# Patient Record
Sex: FEMALE | Race: WHITE | NOT HISPANIC OR LATINO | Employment: FULL TIME | URBAN - METROPOLITAN AREA
[De-identification: names, ages, dates, MRNs, and addresses within clinical notes are randomized per-mention and may not be internally consistent; named-entity substitution may affect disease eponyms.]

---

## 2017-06-02 LAB
EXTERNAL HIV SCREEN: NORMAL
HCV AB SER-ACNC: NEGATIVE

## 2021-05-06 PROBLEM — IMO0001 SMOKING: Status: ACTIVE | Noted: 2021-05-06

## 2023-01-09 PROBLEM — M25.649 JOINT STIFFNESS OF HAND, UNSPECIFIED LATERALITY: Status: ACTIVE | Noted: 2021-09-13

## 2023-01-09 PROBLEM — M79.642 CHRONIC HAND PAIN, LEFT: Status: ACTIVE | Noted: 2021-11-15

## 2023-01-09 PROBLEM — G89.29 CHRONIC HAND PAIN, LEFT: Status: ACTIVE | Noted: 2021-11-15

## 2023-05-11 ENCOUNTER — TELEPHONE (OUTPATIENT)
Dept: GENETICS | Facility: CLINIC | Age: 38
End: 2023-05-11

## 2023-05-11 NOTE — TELEPHONE ENCOUNTER
I called and left a message for Johanne Barnes to call our office to confirm her upcoming appointment on May 16 at 9:00 am, I provided our address in the message

## 2023-05-16 ENCOUNTER — DOCUMENTATION (OUTPATIENT)
Dept: GENETICS | Facility: CLINIC | Age: 38
End: 2023-05-16

## 2023-05-16 ENCOUNTER — TELEPHONE (OUTPATIENT)
Dept: GENETICS | Facility: CLINIC | Age: 38
End: 2023-05-16

## 2023-05-16 ENCOUNTER — CLINICAL SUPPORT (OUTPATIENT)
Dept: GENETICS | Facility: CLINIC | Age: 38
End: 2023-05-16

## 2023-05-16 DIAGNOSIS — Z80.41 FAMILY HISTORY OF OVARIAN CANCER: Primary | ICD-10-CM

## 2023-05-16 DIAGNOSIS — Z80.3 FAMILY HISTORY OF BREAST CANCER: ICD-10-CM

## 2023-05-16 NOTE — PROGRESS NOTES
"Pre-Test Genetic Counseling Consult Note    Patient Name: Razia Crawley   /Age: [age] y o  Referring Provider: NAGI Barillas    Date of Service: 2023  Genetic Counselor: Laurie Vasquez MS, [address]  Interpretation Services: None  Location: Telephone consult   Length of Visit: 61 minutes      Everett Grover was referred to the [address] [place] [address] and Genetic Assessment Program due to her family history of breast and ovarian cancer  she presents today to discuss the possibility of a hereditary cancer syndrome, options for genetic testing, and implications for her and her family          Cancer History and Treatment:   Personal History: no personal history of cancer    Screening Hx:   Breast:  Clinical exams annually    Colon:  Colonoscopy: none    Gynecologic:  Ovaries and Uterus intact ; bilateral salpingectomy at 32    Skin:  No current screening    Other screening: none    Reproductive History  Age at menarche: 17  Age at first live birth: 27  Menopause: Premenopausal  Hormone replacement: none    Medical and Surgical History  Pertinent surgical history:   Past Surgical History:   Procedure Laterality Date   •  SECTION      x1   • NE BREAST AUGMENTATION WITH IMPLANT Bilateral 2021    Procedure: AUGMENTATION BREAST;  Surgeon: Lois Felipe MD;  Location: [address] [place] [address];  Service: Plastics   • TUBAL LIGATION        Pertinent medical history:  Past Medical History:   Diagnosis Date   • Abnormal Pap smear of cervix    • History of urinary tract infection     Past hx of UTI - no current issues   • Kidney stone     x1 episode with kidney stone- no surgery needed -passed on her own    • PTSD (post-traumatic stress disorder)     Pt states with death of a daughter - is stable at this time   • Seasonal allergies          Other History:  Height:   Ht Readings from Last 1 Encounters:   23 5' 9\" (1 753 m)     Weight:   Wt Readings from Last 1 Encounters:   23 76 2 kg (168 lb)       Relevant Family " History   Patient reports no Ashkenazi Jainism ancestry  Maternal Family History:  No reported history of cancer    Paternal Family History:   Grandmother (d 70) history of ovarian cancer (dx 48) and breast cancer (dx 76)   No other reported history of cancer    Please refer to the scanned pedigree in the Media Tab for a complete family history     *All history is reported as provided by the patient; records are not available for review, except where indicated  Assessment:  We discussed sporadic, familial and hereditary cancer  We also discussed the many factors that influence our risk for cancer such as age, environmental exposures, lifestyle choices and family history  We reviewed the indications suggestive of a hereditary predisposition to cancer  Genetic testing is indicated for Lila Shultz based on the following criteria: Meets NCCN V2 2023 Testing Criteria for High-Penetrance Breast Cancer Susceptibility Genes: family history of a second-degree relative with a history of breast and ovarian cancer  Meets NCCN V2 2023 Testing Criteria for Ovarian Cancer Susceptibility Genes: family history of a second-degree relative diagnosed with ovarian cancer    The risks, benefits, and limitations of genetic testing were reviewed with the patient, as well as genetic discrimination laws, and possible test results (positive, negative, variants of uncertain significance) and their clinical implications  If positive for a mutation, options for managing cancer risk including increased surveillance, chemoprevention, and in some cases prophylactic surgery were discussed  Lila Shultz was informed that if a hereditary cancer syndrome was identified in her, first degree relatives (parents, siblings, and children) have a chance of also inheriting the condition  Genetic testing would allow for predictive genetic testing in other relatives, who may also be at risk depending on their degree of relation       Billing:  Most individuals pay <$100 for hereditary cancer genetic testing  If insurance covers the cost of the testing, individuals may still pay out of pocket secondary to co-pays, co-insurance, or deductibles  If the cost of the testing exceeds $100, the lab will reach out to the patient via phone or e-mail  The patient will then have the option to proceed with the testing, cancel the testing, or elect the self-pay option of $250  Heather Crane verbalized understanding  Plan: Patient decided to proceed with testing and provided consent  Summary:     Sample Collection:  A blood kit was mailed home to the patient    Genetic Testing Preformed: CustomNext: Cancer® +RNAinsight® (61 genes): APC, GABRIELLA, AXIN2, BAP1, BARD1, BMPR1A, BRCA1, BRCA2, BRIP1, CDH1, CDK4, CDKN1B, CDKN2A, CHEK2, CTNNA1, DICER1, EGLN1, EPCAM, FH, FLCN, GREM1, HOXB13, KIF1B, KIT, MAX, MEN1, MET, MITF, MLH1, MSH2, MSH3, MSH6, MUTYH, NBN, NF1, NTHL1, PALB2, PMS2, POLD1, POLE, POT1, PTEN, RAD50, RAD51C, RAD51D, RB1, RECQL, RET, SDHA, SDHAF2, SDHB, SDHC, SDHD, SMAD4, SMARCA4, STK11, OMLH058, TP53, TSC1, TSC2, VHL     Result Call Information:  I confirmed the patient's mobile number on file as the best number to call with results  I confirmed with the patient that we can leave a voicemail on the provided numbers    Results take approximately 2-3 weeks to complete once test is started  We will contact Heather Crane once results are available  Additional recommendations for surveillance/medical management will be made pending genetic test results

## 2023-05-16 NOTE — TELEPHONE ENCOUNTER
Patient called into our office regarding her 9:00 am appointment she will not be able to come in person as her daughter woke up sick, I offered converting her appointment to a televisit she was agreeable

## 2023-05-16 NOTE — LETTER
May 16, 2023     Francoise Rubio, [address]  [address] [place] [address]    Patient: Ana Kumar  YOB: 1985  Date of Visit: 2023      Dear Dr Cristopher Ramirez: Thank you for referring Ana Kumar to me for evaluation  Below are my notes for this consultation  If you have questions, please do not hesitate to call me  I look forward to following your patient along with you  Sincerely,        Rica Hinton        CC: Katerina Rodriguez DO        Pre-Test Genetic Counseling Consult Note    Patient Name: Ana Kumar   /Age: /30 y o  Referring Provider: NAGI Gonzalez    Date of Service: 2023  Genetic Counselor: Rica Hinton MS, [address] [facility]  Interpretation Services: None  Location: Telephone consult   Length of Visit: 61 minutes      Yaakov Moss was referred to the [address] [place] [address] and Genetic Assessment Program due to her family history of breast and ovarian cancer  she presents today to discuss the possibility of a hereditary cancer syndrome, options for genetic testing, and implications for her and her family          Cancer History and Treatment:   Personal History: no personal history of cancer    Screening Hx:   Breast:  Clinical exams annually    Colon:  Colonoscopy: none    Gynecologic:  Ovaries and Uterus intact ; bilateral salpingectomy at 32    Skin:  No current screening    Other screening: none    Reproductive History  Age at menarche: 17  Age at first live birth: 27  Menopause: Premenopausal  Hormone replacement: none    Medical and Surgical History  Pertinent surgical history:   Past Surgical History:   Procedure Laterality Date   •  SECTION      x1   • AR BREAST AUGMENTATION WITH IMPLANT Bilateral 2021    Procedure: AUGMENTATION BREAST;  Surgeon: Sotero Walls MD;  Location: [address] [place] [address];  Service: Plastics   • TUBAL LIGATION        Pertinent medical history:  Past Medical History:   Diagnosis Date   • Abnormal Pap smear of cervix    • "History of urinary tract infection     Past hx of UTI - no current issues   • Kidney stone     x1 episode with kidney stone- no surgery needed -passed on her own    • PTSD (post-traumatic stress disorder)     Pt states with death of a daughter - is stable at this time   • Seasonal allergies          Other History:  Height:   Ht Readings from Last 1 Encounters:   01/09/23 5' 9\" (1 753 m)     Weight:   Wt Readings from Last 1 Encounters:   01/09/23 76 2 kg (168 lb)       Relevant Family History   Patient reports no Ashkenazi Hinduism ancestry  Maternal Family History:  No reported history of cancer    Paternal Family History:   Grandmother (d 70) history of ovarian cancer (dx 48) and breast cancer (dx 76)   No other reported history of cancer    Please refer to the scanned pedigree in the Media Tab for a complete family history     *All history is reported as provided by the patient; records are not available for review, except where indicated  Assessment:  We discussed sporadic, familial and hereditary cancer  We also discussed the many factors that influence our risk for cancer such as age, environmental exposures, lifestyle choices and family history  We reviewed the indications suggestive of a hereditary predisposition to cancer  Genetic testing is indicated for Orion Keita based on the following criteria: Meets NCCN V2 2023 Testing Criteria for High-Penetrance Breast Cancer Susceptibility Genes: family history of a second-degree relative with a history of breast and ovarian cancer  Meets NCCN V2 2023 Testing Criteria for Ovarian Cancer Susceptibility Genes: family history of a second-degree relative diagnosed with ovarian cancer    The risks, benefits, and limitations of genetic testing were reviewed with the patient, as well as genetic discrimination laws, and possible test results (positive, negative, variants of uncertain significance) and their clinical implications   If positive for a mutation, " options for managing cancer risk including increased surveillance, chemoprevention, and in some cases prophylactic surgery were discussed  Jessie Christine was informed that if a hereditary cancer syndrome was identified in her, first degree relatives (parents, siblings, and children) have a chance of also inheriting the condition  Genetic testing would allow for predictive genetic testing in other relatives, who may also be at risk depending on their degree of relation  Billing:  Most individuals pay <$100 for hereditary cancer genetic testing  If insurance covers the cost of the testing, individuals may still pay out of pocket secondary to co-pays, co-insurance, or deductibles  If the cost of the testing exceeds $100, the lab will reach out to the patient via phone or e-mail  The patient will then have the option to proceed with the testing, cancel the testing, or elect the self-pay option of $250  Jessie Christine verbalized understanding  Plan: Patient decided to proceed with testing and provided consent  Summary:     Sample Collection:  A blood kit was mailed home to the patient    Genetic Testing Preformed: CustomNext: Cancer® +RNAinsight® (61 genes): APC, GABRIELLA, AXIN2, BAP1, BARD1, BMPR1A, BRCA1, BRCA2, BRIP1, CDH1, CDK4, CDKN1B, CDKN2A, CHEK2, CTNNA1, DICER1, EGLN1, EPCAM, FH, FLCN, GREM1, HOXB13, KIF1B, KIT, MAX, MEN1, MET, MITF, MLH1, MSH2, MSH3, MSH6, MUTYH, NBN, NF1, NTHL1, PALB2, PMS2, POLD1, POLE, POT1, PTEN, RAD50, RAD51C, RAD51D, RB1, RECQL, RET, SDHA, SDHAF2, SDHB, SDHC, SDHD, SMAD4, SMARCA4, STK11, FZCL067, TP53, TSC1, TSC2, VHL     Result Call Information:  I confirmed the patient's mobile number on file as the best number to call with results  I confirmed with the patient that we can leave a voicemail on the provided numbers    Results take approximately 2-3 weeks to complete once test is started  We will contact Jessie Christine once results are available        Additional recommendations for surveillance/medical management will be made pending genetic test results

## 2023-05-18 ENCOUNTER — APPOINTMENT (OUTPATIENT)
Dept: LAB | Facility: CLINIC | Age: 38
End: 2023-05-18

## 2023-05-18 ENCOUNTER — TRANSCRIBE ORDERS (OUTPATIENT)
Dept: LAB | Facility: CLINIC | Age: 38
End: 2023-05-18

## 2023-05-18 DIAGNOSIS — Z80.41 FAMILY HISTORY OF MALIGNANT NEOPLASM OF OVARY: ICD-10-CM

## 2023-05-18 DIAGNOSIS — Z80.3 FAMILY HISTORY OF MALIGNANT NEOPLASM OF BREAST: Primary | ICD-10-CM

## 2023-05-18 DIAGNOSIS — Z80.3 FAMILY HISTORY OF MALIGNANT NEOPLASM OF BREAST: ICD-10-CM

## 2023-06-09 ENCOUNTER — TELEPHONE (OUTPATIENT)
Dept: GENETICS | Facility: CLINIC | Age: 38
End: 2023-06-09

## 2023-06-09 NOTE — TELEPHONE ENCOUNTER
Post-Test Genetic Counseling Consult Note  Today I left a voicemail for Fili Tran reviewing the results of her genetic test for hereditary cancer  We met previously on 5/16 for pre-test counseling  I encouraged her to call [phone] to discuss this result further  A copy of this consult note and genetic test result will be shared with the patient  SUMMARY:    Test(s): CustomNext: Cancer® +RNAinsight® (61 genes): APC, GABRIELLA, AXIN2, BAP1, BARD1, BMPR1A, BRCA1, BRCA2, BRIP1, CDH1, CDK4, CDKN1B, CDKN2A, CHEK2, CTNNA1, DICER1, EGLN1, EPCAM, FH, FLCN, GREM1, HOXB13, KIF1B, KIT, MAX, MEN1, MET, MITF, MLH1, MSH2, MSH3, MSH6, MUTYH, NBN, NF1, NTHL1, PALB2, PMS2, POLD1, POLE, POT1, PTEN, RAD50, RAD51C, RAD51D, RB1, RECQL, RET, SDHA, SDHAF2, SDHB, SDHC, SDHD, SMAD4, SMARCA4, STK11, [id number], TP53, TSC1, TSC2, VHL     Result: Negative - No Clinically Significant Variants Detected      Assessment:   A negative result significantly reduces the likelihood that Fili Tran has a hereditary cancer syndrome  However, this testing is unable to completely rule out the presence of hereditary cancer  It remains possible that:  - There is a variant in an area of a gene which was not tested or there is a variant not detectable due to technical limitations of this test      - There is a variant in another gene that was not included in this test or in a gene not known to be linked to cancer or tumors  - A family member has a genetic variant that the patient did not inherit  - The cancer in the family is sporadic and is related to non-hereditary factors  Risk based on Family History:    Matt's risk of ovarian cancer is increased, based on the reported family history  As compared with the general population risk of approximately 1 5%, empiric data suggest that Brandons risk to develop ovarian cancer is approximately 3% given one second degree relative with ovarian cancer   It is important to note that this may be an overestimate of risk, as the BRCA1/2 status of the families used to generate this risk figure is unknown, [PMID: [id number]      Matt's risk of breast cancer may still be increased based on her personal risk factors and family history  Despite a negative test result, we are able to run risk models to better estimate Matt's lifetime risk of developing breast cancer  I ran three risk models based on the information Lindsay Jiang provided during our pre-test counseling session:    Van-Kai model: Estimated lifetime risk, to age 80, for breast cancer is 15 2% compared to approximately 13 2% general population risk     Charley Officer model:Estimated lifetime risk, to age 80, for breast cancer is 9 2% compared to approximately 12 6% general population risk     Raghu tables: Estimated lifetime risk, to age 78, for breast cancer is 9 4%    Although these risk models do not predict a significantly increased lifetime risk, we cannot eliminate the possibility of an increased risk for breast cancer for Lindsay Jiang given her family history  I also reminded Lindsay Jiang that there is still a background risk for any woman to develop breast cancer over her lifetime (12-13%), regardless of family history  Risks and Testing for Family Members:    Despite a negative result, Matt's first-degree relatives may be at increased risk for the cancers based on the family history  We recommend they discuss screening and management recommendations with their healthcare providers  At this time we do not recommend testing for Lindsay Jiang 's daughter based on her negative test result  Lindsay Jiang 's daughter still needs to consider the history of cancer on the other side of her family when determining her risks  If Lindsay Jiang has any affected family members with a cancer diagnosis, especially at a young age, they may still consider genetic testing   Relatives who wish to pursue genetic testing can reach out to the [address] [place] [address] [phone] ([phone]) to schedule an appointment or visit www [facility] org to identify a local genetic counselor  Additional Information:  A healthy lifestyle will improve overall health and reduce risk for illness  Eating a healthy diet and exercising for 4 hours per week is recommended  Both diet and exercise have been shown to help maintain a healthy weight  Postmenopausal women who are overweight are at higher risk for breast cancer  Moderate to heavy alcohol use can increase the risk for some cancers  Smoking cigarettes can also increase risk for breast, lung, prostate, pancreatic and other cancers  Plan:   There are no additional recommendations based on Matt's negative result  she should continue cancer screening and medical management as clinically indicated and as determined appropriate by her healthcare providers  Negative Result: Esperanza Muñiz was strongly encouraged to contact us regarding any changes in her personal or family history of cancer as these changes could alter our recommendation regarding genetic testing and/or cancer screening

## 2023-06-12 ENCOUNTER — TELEPHONE (OUTPATIENT)
Dept: GENETICS | Facility: CLINIC | Age: 38
End: 2023-06-12

## 2023-06-12 NOTE — TELEPHONE ENCOUNTER
----- Message from Keon Grimes sent at 6/9/2023 11:32 AM EDT -----  Regarding: complete  GC Completed Chart     Please send [employer] message with lab result and result note

## 2024-06-13 ENCOUNTER — OFFICE VISIT (OUTPATIENT)
Dept: FAMILY MEDICINE CLINIC | Facility: CLINIC | Age: 39
End: 2024-06-13
Payer: COMMERCIAL

## 2024-06-13 VITALS
HEIGHT: 69 IN | HEART RATE: 60 BPM | SYSTOLIC BLOOD PRESSURE: 108 MMHG | BODY MASS INDEX: 26.96 KG/M2 | DIASTOLIC BLOOD PRESSURE: 62 MMHG | WEIGHT: 182 LBS | OXYGEN SATURATION: 99 % | TEMPERATURE: 97.8 F

## 2024-06-13 DIAGNOSIS — F90.1 ATTENTION DEFICIT HYPERACTIVITY DISORDER (ADHD), PREDOMINANTLY HYPERACTIVE TYPE: ICD-10-CM

## 2024-06-13 DIAGNOSIS — Z00.00 ANNUAL PHYSICAL EXAM: Primary | ICD-10-CM

## 2024-06-13 DIAGNOSIS — F32.5 MAJOR DEPRESSION IN REMISSION (HCC): ICD-10-CM

## 2024-06-13 DIAGNOSIS — B02.9 HERPES ZOSTER WITHOUT COMPLICATION: ICD-10-CM

## 2024-06-13 DIAGNOSIS — Z76.89 ENCOUNTER TO ESTABLISH CARE: ICD-10-CM

## 2024-06-13 PROBLEM — Z02.83 ENCOUNTER FOR DRUG SCREENING: Status: RESOLVED | Noted: 2021-02-25 | Resolved: 2024-06-13

## 2024-06-13 PROCEDURE — 99385 PREV VISIT NEW AGE 18-39: CPT | Performed by: NURSE PRACTITIONER

## 2024-06-13 RX ORDER — NITROFURANTOIN 25; 75 MG/1; MG/1
100 CAPSULE ORAL 2 TIMES DAILY
COMMUNITY
Start: 2024-06-12 | End: 2024-06-17

## 2024-06-13 RX ORDER — DEXTROAMPHETAMINE SACCHARATE, AMPHETAMINE ASPARTATE MONOHYDRATE, DEXTROAMPHETAMINE SULFATE AND AMPHETAMINE SULFATE 2.5; 2.5; 2.5; 2.5 MG/1; MG/1; MG/1; MG/1
10 CAPSULE, EXTENDED RELEASE ORAL EVERY MORNING
Qty: 30 CAPSULE | Refills: 0 | Status: SHIPPED | OUTPATIENT
Start: 2024-06-13

## 2024-06-13 RX ORDER — VALACYCLOVIR HYDROCHLORIDE 1 G/1
1000 TABLET, FILM COATED ORAL 2 TIMES DAILY
Qty: 14 TABLET | Refills: 0 | Status: SHIPPED | OUTPATIENT
Start: 2024-06-13 | End: 2024-06-20

## 2024-06-13 RX ORDER — LORAZEPAM 0.5 MG/1
0.5 TABLET ORAL EVERY 6 HOURS PRN
COMMUNITY
Start: 2023-10-06

## 2024-06-13 RX ORDER — FLUOXETINE HYDROCHLORIDE 20 MG/1
20 CAPSULE ORAL DAILY
COMMUNITY
Start: 2024-05-06

## 2024-06-13 RX ORDER — GABAPENTIN 100 MG/1
100 CAPSULE ORAL 2 TIMES DAILY
Qty: 30 CAPSULE | Refills: 5 | Status: SHIPPED | OUTPATIENT
Start: 2024-06-13

## 2024-06-13 NOTE — PROGRESS NOTES
Adult Annual Physical  Name: Matt Barbour      : 1985      MRN: [id number]  Encounter Provider: NAGI Gonzalez  Encounter Date: 2024   Encounter department: [facility]    Assessment & Plan   1. Annual physical exam  2. Encounter to establish care  3. Herpes zoster without complication  -     valACYclovir (VALTREX) 1,000 mg tablet; Take 1 tablet (1,000 mg total) by mouth 2 (two) times a day for 7 days  -     gabapentin (NEURONTIN) 100 mg capsule; Take 1 capsule (100 mg total) by mouth 2 (two) times a day  4. Attention deficit hyperactivity disorder (ADHD), predominantly hyperactive type  -     amphetamine-dextroamphetamine (ADDERALL XR, 10MG,) 10 MG 24 hr capsule; Take 1 capsule (10 mg total) by mouth every morning Max Daily Amount: 10 mg  5. Major depression in remission (HCC)    #1 Annual physical exam  #2 Encounter to establish care  Patient is transferring her care from [facility] (Dr Gabriela Tapia, DO)  #3 Herpes zoster without complication   Discussed with patient plan to treat with 7 day course of Valacyclovir along with gabapentin 100 mg twice a day  #4 Attention deficit hyperactive disorder (ADHD), predominantly hyperactive type  Discussed with patient that she miller shave a positive screen for ADHD so will plan to treat with a low dose of Adderall XR 10 mg daily and recheck effectiveness in one month  #5 Major depression in remission (HCC)  Patient currently on fluoxetine 20 mg daily and stable with no desire to be weaned off at this time  Patient instructed to return in one month for check on effectiveness of ADHD medication and annually for physical exams  Leslin encouraged to call for problems on concerns in the interim    Patient instructed to returns in one year or sooner if needed  Patient encouraged to call for problems or concerns in the interim  Immunizations and preventive care screenings were discussed with patient today.  "Appropriate education was printed on patient's after visit summary.    Counseling:  Alcohol/drug use: discussed moderation in alcohol intake, the recommendations for healthy alcohol use, and avoidance of illicit drug use.  Dental Health: discussed importance of regular tooth brushing, flossing, and dental visits.  Exercise: the importance of regular exercise/physical activity was discussed. Recommend exercise 3-5 times per week for at least 30 minutes.       Depression Screening and Follow-up Plan: Patient's depression screening was positive with a PHQ-9 score of 10. Patient assessed for underlying major depression. Brief counseling provided and recommend additional follow-up/re-evaluation next office visit. Patient with underlying depression and was advised to continue current medications as prescribed.       History of Present Illness     Adult Annual Physical:  Patient presents for annual physical. Patient here to establish care with this practice and organization. She is due for a comprehensive physical exam. The patient reports that her  is a patient at this practice and was not satisfied with her he primary care provider. She reports that she is having difficulty with concentration, not task oriented and feelings that she needs to be constantly moving. She also reports that she is leaving for a trip to [place] this weekend and has developed a rah on her leg that she was told looks like shingles. The rash is red, \"bubbly\" and burning..     Diet and Physical Activity:  - Diet/Nutrition: well balanced diet, limited junk food, low calorie diet and low carb diet.  - Exercise: moderate cardiovascular exercise.    Depression Screening:    - PHQ-9 Score: 10    General Health:  - Sleep: sleeps well and 7-8 hours of sleep on average.  - Hearing: normal hearing right ear and normal hearing left ear.  - Vision: no vision problems.  - Dental: regular dental visits and brushes teeth twice daily.    /GYN Health:  - " Follows with GYN: yes.   - Menopause: perimenopausal.   - Last menstrual cycle: 6/5/2024.   - History of STDs: no  - Contraception:. tubal ligation      Advanced Care Planning:  - Has an advanced directive?: no    - Has a durable medical POA?: no    - ACP document given to patient?: no      Review of Systems   Constitutional:  Negative for activity change, appetite change and unexpected weight change.   HENT:  Negative for dental problem, ear pain, hearing loss, nosebleeds, sneezing, sore throat, tinnitus and trouble swallowing.    Eyes:  Negative for visual disturbance.   Respiratory:  Negative for cough, chest tightness, shortness of breath and wheezing.    Cardiovascular:  Negative for chest pain, palpitations and leg swelling.   Gastrointestinal:  Negative for abdominal distention, abdominal pain, constipation, diarrhea and nausea.   Endocrine: Negative for polydipsia, polyphagia and polyuria.   Genitourinary: Negative.    Musculoskeletal:  Negative for arthralgias, back pain, myalgias and neck pain.   Skin:  Positive for rash. Negative for color change.   Allergic/Immunologic: Negative for environmental allergies.   Neurological:  Negative for dizziness, weakness, light-headedness and headaches.   Hematological: Negative.    Psychiatric/Behavioral:  Positive for decreased concentration and dysphoric mood. Negative for sleep disturbance. The patient is nervous/anxious and is hyperactive.      Current Outpatient Medications on File Prior to Visit   Medication Sig Dispense Refill   • cetirizine (ZyrTEC) 10 mg tablet Take 10 mg by mouth daily     • Cholecalciferol (Vitamin D3) 50 MCG (2000 UT) TABS Take 2,000 Units by mouth daily     • FLUoxetine (PROzac) 20 mg capsule Take 20 mg by mouth daily     • LORazepam (ATIVAN) 0.5 mg tablet Take 0.5 mg by mouth every 6 (six) hours as needed     • Multiple Vitamin (multivitamin) tablet Take 1 tablet by mouth daily     • Multiple Vitamins-Minerals (ZINC PO) Take by mouth    "  • nitrofurantoin (MACROBID) 100 mg capsule Take 100 mg by mouth 2 (two) times a day     • [DISCONTINUED] fluticasone (FLONASE) 50 mcg/act nasal spray 2 sprays into each nostril daily       No current facility-administered medications on file prior to visit.        Objective     /62   Pulse 60   Temp 97.8 °F (36.6 °C)   Ht 5' 9\" (1.753 m)   Wt 82.6 kg (182 lb)   LMP 06/05/2024 (Exact Date)   SpO2 99%   BMI 26.88 kg/m² (Reviewed)    Physical Exam  Vitals reviewed.   Constitutional:       General: She is not in acute distress.     Appearance: Normal appearance. She is well-developed and well-groomed. She is not ill-appearing.   HENT:      Head: Normocephalic and atraumatic.      Right Ear: External ear normal.      Left Ear: External ear normal.      Nose: Nose normal.      Mouth/Throat:      Lips: Pink.      Mouth: Mucous membranes are moist.      Pharynx: Oropharynx is clear.   Eyes:      General: Lids are normal.      Extraocular Movements: Extraocular movements intact.      Conjunctiva/sclera: Conjunctivae normal.      Pupils: Pupils are equal, round, and reactive to light.   Neck:      Thyroid: No thyromegaly or thyroid tenderness.      Trachea: Trachea and phonation normal.   Cardiovascular:      Rate and Rhythm: Normal rate and regular rhythm.      Pulses:           Radial pulses are 2+ on the right side and 2+ on the left side.        Dorsalis pedis pulses are 2+ on the right side and 2+ on the left side.        Posterior tibial pulses are 2+ on the right side and 2+ on the left side.      Heart sounds: Normal heart sounds. No murmur heard.  Pulmonary:      Effort: Pulmonary effort is normal.      Breath sounds: Normal breath sounds.   Abdominal:      General: Abdomen is flat. Bowel sounds are normal. There is no distension.      Palpations: Abdomen is soft.      Tenderness: There is no abdominal tenderness.   Musculoskeletal:         General: Normal range of motion.      Cervical back: Neck " supple.      Right lower leg: No edema.      Left lower leg: No edema.   Skin:     General: Skin is warm and dry.      Capillary Refill: Capillary refill takes less than 2 seconds.      Findings: Rash present.      Comments: Erythematous blistering vesicular rash along dermatone of left leg.   Neurological:      General: No focal deficit present.      Mental Status: She is alert and oriented to person, place, and time.      Cranial Nerves: Cranial nerves 2-12 are intact.   Psychiatric:         Mood and Affect: Mood normal.         Behavior: Behavior normal. Behavior is cooperative.       Administrative Statements {Disappearing Hyperlinks I  Level of Service * [facility]/PCSP:[id number]}

## 2024-06-14 PROBLEM — G47.00 INSOMNIA: Status: RESOLVED | Noted: 2021-02-25 | Resolved: 2024-06-14

## 2024-06-14 PROBLEM — M54.2 NECK PAIN: Status: RESOLVED | Noted: 2022-01-20 | Resolved: 2024-06-14

## 2024-06-14 PROBLEM — N20.0 NEPHROLITHIASIS: Status: RESOLVED | Noted: 2021-05-06 | Resolved: 2024-06-14

## 2024-06-14 PROBLEM — R51.9 LEFT-SIDED HEADACHE: Status: RESOLVED | Noted: 2022-10-24 | Resolved: 2024-06-14

## 2024-06-14 PROBLEM — M25.40 JOINT SWELLING: Status: RESOLVED | Noted: 2021-09-13 | Resolved: 2024-06-14

## 2024-06-20 DIAGNOSIS — J20.9 ACUTE BRONCHITIS, UNSPECIFIED ORGANISM: Primary | ICD-10-CM

## 2024-06-20 RX ORDER — BENZONATATE 100 MG/1
100 CAPSULE ORAL 3 TIMES DAILY PRN
Qty: 30 CAPSULE | Refills: 0 | Status: SHIPPED | OUTPATIENT
Start: 2024-06-20

## 2024-06-20 RX ORDER — AZITHROMYCIN 250 MG/1
TABLET, FILM COATED ORAL
Qty: 6 TABLET | Refills: 0 | Status: SHIPPED | OUTPATIENT
Start: 2024-06-20 | End: 2024-06-24

## 2024-07-02 DIAGNOSIS — F32.5 MAJOR DEPRESSION IN REMISSION (HCC): Primary | ICD-10-CM

## 2024-07-02 DIAGNOSIS — F90.1 ATTENTION DEFICIT HYPERACTIVITY DISORDER (ADHD), PREDOMINANTLY HYPERACTIVE TYPE: ICD-10-CM

## 2024-07-02 RX ORDER — DEXTROAMPHETAMINE SACCHARATE, AMPHETAMINE ASPARTATE MONOHYDRATE, DEXTROAMPHETAMINE SULFATE AND AMPHETAMINE SULFATE 2.5; 2.5; 2.5; 2.5 MG/1; MG/1; MG/1; MG/1
10 CAPSULE, EXTENDED RELEASE ORAL EVERY MORNING
Qty: 30 CAPSULE | Refills: 0 | Status: SHIPPED | OUTPATIENT
Start: 2024-07-02 | End: 2024-07-03 | Stop reason: DRUGHIGH

## 2024-07-02 RX ORDER — FLUOXETINE HYDROCHLORIDE 20 MG/1
20 CAPSULE ORAL DAILY
Qty: 90 CAPSULE | Refills: 3 | Status: SHIPPED | OUTPATIENT
Start: 2024-07-02

## 2024-07-02 NOTE — TELEPHONE ENCOUNTER
1 [id number] 06/13/2024 06/13/2024 Mixed Amphetamine Salts (Capsule, Extended Release) 30.0 30 10 MG NA JOSE ALFREDO DIEGO [facility] Commercial Insurance 0 / 0 [place]    1 [id number] 05/08/2024 05/08/2024 LORazepam (Tablet) 45.0 11 0.5 MG NA VERN ALLERTON [facility] Commercial Insurance 0 / 0 [place]    1 [id number] 01/03/2024 01/03/2024 LORazepam (Tablet) 45.0 11 0.5 MG NA VERN [facility] Commercial Insurance 0 / 0 [place]    1 [id number] 09/19/2023 09/18/2023 LORazepam (Tablet) 45.0 11 0.5 MG NA VERN [facility] Commercial Insurance 0 / 0 [place]    1 [id number] 07/28/2023 07/28/2023 LORazepam 05 MG TABLET (Tablet) 45.0 11 0.5 MG NA [facility]ERN [facility] Commercial Insurance 0 / 0 [place]

## 2024-07-03 DIAGNOSIS — F90.1 ATTENTION DEFICIT HYPERACTIVITY DISORDER (ADHD), PREDOMINANTLY HYPERACTIVE TYPE: Primary | ICD-10-CM

## 2024-07-03 RX ORDER — DEXTROAMPHETAMINE SACCHARATE, AMPHETAMINE ASPARTATE MONOHYDRATE, DEXTROAMPHETAMINE SULFATE AND AMPHETAMINE SULFATE 5; 5; 5; 5 MG/1; MG/1; MG/1; MG/1
20 CAPSULE, EXTENDED RELEASE ORAL EVERY MORNING
Qty: 30 CAPSULE | Refills: 0 | Status: SHIPPED | OUTPATIENT
Start: 2024-07-03

## 2024-07-29 DIAGNOSIS — F90.1 ATTENTION DEFICIT HYPERACTIVITY DISORDER (ADHD), PREDOMINANTLY HYPERACTIVE TYPE: ICD-10-CM

## 2024-07-29 RX ORDER — DEXTROAMPHETAMINE SACCHARATE, AMPHETAMINE ASPARTATE MONOHYDRATE, DEXTROAMPHETAMINE SULFATE AND AMPHETAMINE SULFATE 5; 5; 5; 5 MG/1; MG/1; MG/1; MG/1
20 CAPSULE, EXTENDED RELEASE ORAL EVERY MORNING
Qty: 30 CAPSULE | Refills: 0 | Status: SHIPPED | OUTPATIENT
Start: 2024-07-29

## 2024-07-29 NOTE — TELEPHONE ENCOUNTER
1 [id number] 07/03/2024 07/03/2024 Mixed Amphetamine Salts (Capsule, Extended Release) 30.0 30 20 MG NA JOSE ALFREDO BRANDIE [facility] Commercial Insurance 0 / 0 [place]    1 [id number] 06/13/2024 06/13/2024 Mixed Amphetamine Salts (Capsule, Extended Release) 30.0 30 10 MG NA JOSE ALFREDO BRANDIE [facility] Commercial Insurance 0 / 0 [place]    1 [id number] 05/08/2024 05/08/2024 LORazepam (Tablet) 45.0 11 0.5 MG NA VERN ALLERT[facility] Commercial Insurance 0 / 0 [place]    1 [id number] 01/03/2024 01/03/2024 LORazepam (Tablet) 45.0 11 0.5 MG NA VERN ALLERTON [facility] Commercial Insurance 0 / 0 [place]    1 [id number] 09/19/2023 09/18/2023 LORazepam (Tablet) 45.0 11 0.5 MG NA VERN ALLERTON [facility] Commercial Insurance 0 / 0 [place]

## 2024-09-01 DIAGNOSIS — F41.9 ANXIETY: Primary | ICD-10-CM

## 2024-09-01 DIAGNOSIS — F90.1 ATTENTION DEFICIT HYPERACTIVITY DISORDER (ADHD), PREDOMINANTLY HYPERACTIVE TYPE: ICD-10-CM

## 2024-09-03 RX ORDER — DEXTROAMPHETAMINE SACCHARATE, AMPHETAMINE ASPARTATE MONOHYDRATE, DEXTROAMPHETAMINE SULFATE AND AMPHETAMINE SULFATE 5; 5; 5; 5 MG/1; MG/1; MG/1; MG/1
20 CAPSULE, EXTENDED RELEASE ORAL EVERY MORNING
Qty: 30 CAPSULE | Refills: 0 | Status: SHIPPED | OUTPATIENT
Start: 2024-09-03 | End: 2024-09-09 | Stop reason: DRUGHIGH

## 2024-09-03 RX ORDER — LORAZEPAM 0.5 MG/1
0.5 TABLET ORAL EVERY 6 HOURS PRN
Qty: 30 TABLET | Refills: 0 | Status: SHIPPED | OUTPATIENT
Start: 2024-09-03

## 2024-09-03 NOTE — TELEPHONE ENCOUNTER
1 [id number] 08/02/2024 07/29/2024 Mixed Amphetamine Salts (Capsule, Extended Release) 30.0 30 20 MG NA JOSE ALFREDO BRANDIE [facility] Commercial Insurance 0 / 0 [place]    1 [id number] 07/03/2024 07/03/2024 Mixed Amphetamine Salts (Capsule, Extended Release) 30.0 30 20 MG NA JOSE ALFREDO BRANDIE [facility] Commercial Insurance 0 / 0 [place]    1 [id number] 06/13/2024 06/13/2024 Mixed Amphetamine Salts (Capsule, Extended Release) 30.0 30 10 MG NA JOSE ALFREDO BRANDIE [facility] Commercial Insurance 0 / 0 [place]    1 [id number] 05/08/2024 05/08/2024 LORazepam (Tablet) 45.0 11 0.5 MG NA VERN ALLERTON [facility] Commercial Insurance 0 / 0 [place]    1 [id number] 01/03/2024 01/03/2024 LORazepam (Tablet) 45.0 11 0.5 MG NA VERN ALLERTON [facility] Commercial Insurance 0 / 0 [place]    1 [id number] 09/19/2023 09/18/2023 LORazepam (Tablet) 45.0 11 0.5 MG NA VERN ALLERTON [facility] Commercial Insurance 0 / 0 [place]

## 2024-09-09 DIAGNOSIS — F90.1 ATTENTION DEFICIT HYPERACTIVITY DISORDER (ADHD), PREDOMINANTLY HYPERACTIVE TYPE: Primary | ICD-10-CM

## 2024-09-09 RX ORDER — DEXTROAMPHETAMINE SACCHARATE, AMPHETAMINE ASPARTATE MONOHYDRATE, DEXTROAMPHETAMINE SULFATE AND AMPHETAMINE SULFATE 6.25; 6.25; 6.25; 6.25 MG/1; MG/1; MG/1; MG/1
25 CAPSULE, EXTENDED RELEASE ORAL EVERY MORNING
Qty: 30 CAPSULE | Refills: 0 | Status: SHIPPED | OUTPATIENT
Start: 2024-09-09

## 2024-09-12 ENCOUNTER — OFFICE VISIT (OUTPATIENT)
Dept: URGENT CARE | Facility: MEDICAL CENTER | Age: 39
End: 2024-09-12
Payer: COMMERCIAL

## 2024-09-12 VITALS
TEMPERATURE: 97.9 F | DIASTOLIC BLOOD PRESSURE: 59 MMHG | HEIGHT: 69 IN | HEART RATE: 70 BPM | BODY MASS INDEX: 24.59 KG/M2 | SYSTOLIC BLOOD PRESSURE: 111 MMHG | OXYGEN SATURATION: 99 % | RESPIRATION RATE: 18 BRPM | WEIGHT: 166 LBS

## 2024-09-12 DIAGNOSIS — R10.2 PELVIC PAIN: Primary | ICD-10-CM

## 2024-09-12 LAB
SL AMB  POCT GLUCOSE, UA: ABNORMAL
SL AMB LEUKOCYTE ESTERASE,UA: ABNORMAL
SL AMB POCT BILIRUBIN,UA: ABNORMAL
SL AMB POCT BLOOD,UA: ABNORMAL
SL AMB POCT CLARITY,UA: CLEAR
SL AMB POCT COLOR,UA: YELLOW
SL AMB POCT KETONES,UA: ABNORMAL
SL AMB POCT NITRITE,UA: ABNORMAL
SL AMB POCT PH,UA: 8
SL AMB POCT SPECIFIC GRAVITY,UA: 1005
SL AMB POCT URINE HCG: NORMAL
SL AMB POCT URINE PROTEIN: ABNORMAL
SL AMB POCT UROBILINOGEN: 0.2

## 2024-09-12 PROCEDURE — 87086 URINE CULTURE/COLONY COUNT: CPT | Performed by: FAMILY MEDICINE

## 2024-09-12 PROCEDURE — 87491 CHLMYD TRACH DNA AMP PROBE: CPT | Performed by: FAMILY MEDICINE

## 2024-09-12 PROCEDURE — G0382 LEV 3 HOSP TYPE B ED VISIT: HCPCS | Performed by: FAMILY MEDICINE

## 2024-09-12 PROCEDURE — 81025 URINE PREGNANCY TEST: CPT | Performed by: FAMILY MEDICINE

## 2024-09-12 PROCEDURE — 87591 N.GONORRHOEAE DNA AMP PROB: CPT | Performed by: FAMILY MEDICINE

## 2024-09-12 PROCEDURE — 81002 URINALYSIS NONAUTO W/O SCOPE: CPT | Performed by: FAMILY MEDICINE

## 2024-09-12 NOTE — PATIENT INSTRUCTIONS
Schedule with PCP or GYN for pelvic exam and further work up  Take advil 600mg every 6 hours for pain  We will let you know about your urine culture if it needs treatment.

## 2024-09-12 NOTE — PROGRESS NOTES
[facility]        NAME: Matt Barbour is a 39 y.o. female  : 1985    MRN: [id number]  DATE: 2024  TIME: 10:00 AM    Assessment and Plan   Pelvic pain [R10.2]  1. Pelvic pain  POCT urine dip    POCT urine HCG    Chlamydia/GC amplified DNA by PCR    Chlamydia/GC amplified DNA by PCR    unclear etiology  UA - sm leuk - will send culture  hCG - neg  gc/ct sent   follow up with GYN for full pelvic exam and possible Ultrasound if needed.      Discussed ED precautions for appendicitis or other significant intra abdominal pathology      Patient Instructions       Follow up with PCP in 3-5 days.  Proceed to  ER if symptoms worsen.    If tests have been performed at [facility], our office will contact you with results if changes need to be made to the care plan discussed with you at the visit.  You can review your full results on [facility].    Chief Complaint     Chief Complaint   Patient presents with    Pelvic Pain     Last month she thought she left a tampon inside.  Last menstrual period finished last week.  Week or 2 having cramping (not on period).  She has a white discharge feels like she can't finish urinating all the way.           History of Present Illness       40 yo here for pelvic pain and some urinary symptoms  About 2 weeks ago she had an episode where she thought she might have left a tampon in but she didn't feel one and neither did her . She has had scant white discharge - no foul odor. No systemic symptoms.  She did have an episode this morning where she was going to urinate but wasn't able to empty completely so she came in.      Pelvic Pain  The patient's primary symptoms include pelvic pain. This is a new problem. The current episode started 1 to 4 weeks ago. The problem occurs constantly. The problem has been unchanged. The problem affects both sides. She is not pregnant. Associated symptoms include flank pain. Pertinent negatives include no abdominal  pain, anorexia, back pain, chills, constipation, diarrhea, discolored urine, dysuria, fever, frequency, headaches, hematuria, joint pain, joint swelling, nausea, painful intercourse, rash, sore throat, urgency or vomiting. Nothing aggravates the symptoms. She is sexually active. No, her partner does not have an STD. Contraceptive use: had tubal removal.       Review of Systems   Review of Systems   Constitutional:  Negative for chills and fever.   HENT:  Negative for sore throat.    Gastrointestinal:  Negative for abdominal pain, anorexia, constipation, diarrhea, nausea and vomiting.   Genitourinary:  Positive for flank pain and pelvic pain. Negative for dysuria, frequency, hematuria and urgency.   Musculoskeletal:  Negative for back pain and joint pain.   Skin:  Negative for rash.   Neurological:  Negative for headaches.         Current Medications       Current Outpatient Medications:     amphetamine-dextroamphetamine (ADDERALL XR, 25MG,) 25 MG 24 hr capsule, Take 1 capsule (25 mg total) by mouth every morning Max Daily Amount: 25 mg, Disp: 30 capsule, Rfl: 0    cetirizine (ZyrTEC) 10 mg tablet, Take 10 mg by mouth daily, Disp: , Rfl:     Cholecalciferol (Vitamin D3) 50 MCG (2000 UT) TABS, Take 2,000 Units by mouth daily, Disp: , Rfl:     FLUoxetine (PROzac) 20 mg capsule, Take 1 capsule (20 mg total) by mouth daily, Disp: 90 capsule, Rfl: 3    LORazepam (ATIVAN) 0.5 mg tablet, Take 1 tablet (0.5 mg total) by mouth every 6 (six) hours as needed for anxiety, Disp: 30 tablet, Rfl: 0    Multiple Vitamin (multivitamin) tablet, Take 1 tablet by mouth daily, Disp: , Rfl:     Multiple Vitamins-Minerals (ZINC PO), Take by mouth, Disp: , Rfl:     gabapentin (NEURONTIN) 100 mg capsule, Take 1 capsule (100 mg total) by mouth 2 (two) times a day (Patient not taking: Reported on 9/12/2024), Disp: 30 capsule, Rfl: 5    valACYclovir (VALTREX) 1,000 mg tablet, Take 1 tablet (1,000 mg total) by mouth 2 (two) times a day for 7  "days, Disp: 14 tablet, Rfl: 0    Current Allergies     Allergies as of 2024 - Reviewed 2024   Allergen Reaction Noted    Cefaclor Other (See Comments) 10/24/2013    Wellbutrin [bupropion] Hives 2021            The following portions of the patient's history were reviewed and updated as appropriate: allergies, current medications, past family history, past medical history, past social history, past surgical history and problem list.     Past Medical History:   Diagnosis Date    Abnormal Pap smear of cervix     History of urinary tract infection     Past hx of UTI - no current issues    Kidney stone     x1 episode with kidney stone- no surgery needed -passed on her own     PTSD (post-traumatic stress disorder)     Pt states with death of a daughter - is stable at this time    Seasonal allergies        Past Surgical History:   Procedure Laterality Date     SECTION      x1    IN BREAST AUGMENTATION WITH IMPLANT Bilateral 2021    Procedure: AUGMENTATION BREAST;  Surgeon: Marleen Schafer MD;  Location:  MAIN OR;  Service: Plastics    TUBAL LIGATION         Family History   Problem Relation Age of Onset    No Known Problems Mother     Alcohol abuse Father     Parkinsonism Father     Bipolar disorder Sister     Anxiety disorder Sister     Alcohol abuse Sister     Sleep apnea Maternal Grandmother     Ovarian cancer Paternal Grandmother     Breast cancer Paternal Grandmother          Medications have been verified.        Objective   /59   Pulse 70   Temp 97.9 °F (36.6 °C) (Temporal)   Resp 18   Ht 5' 9\" (1.753 m)   Wt 75.3 kg (166 lb)   LMP 2024   SpO2 99%   BMI 24.51 kg/m²   Patient's last menstrual period was 2024.       Physical Exam     Physical Exam  Vitals reviewed.   Constitutional:       Appearance: Normal appearance.   HENT:      Head: Normocephalic and atraumatic.      Right Ear: Tympanic membrane normal.      Left Ear: Tympanic membrane normal.      Nose: Nose " normal.   Eyes:      Pupils: Pupils are equal, round, and reactive to light.   Cardiovascular:      Rate and Rhythm: Normal rate and regular rhythm.      Heart sounds: No murmur heard.  Pulmonary:      Effort: Pulmonary effort is normal. No respiratory distress.      Breath sounds: Normal breath sounds.   Abdominal:      General: Abdomen is flat. Bowel sounds are normal. There is no distension.      Palpations: Abdomen is soft. There is no mass.      Tenderness: There is abdominal tenderness. There is no right CVA tenderness, left CVA tenderness, guarding or rebound.      Hernia: No hernia is present.      Comments: Tenderness to deep palpation over lower abd/rlq/llq but no rebound or guarding.    Neurological:      Mental Status: She is alert.

## 2024-09-13 LAB
BACTERIA UR CULT: NORMAL
C TRACH DNA SPEC QL NAA+PROBE: NEGATIVE
N GONORRHOEA DNA SPEC QL NAA+PROBE: NEGATIVE

## 2024-10-14 ENCOUNTER — TELEPHONE (OUTPATIENT)
Dept: ADMINISTRATIVE | Facility: OTHER | Age: 39
End: 2024-10-14

## 2024-10-14 NOTE — TELEPHONE ENCOUNTER
----- Message from Hui BRYAN sent at 10/11/2024  3:31 PM EDT -----  Regarding: care gap request  10/11/24 3:31 PM    Hello, our patient attached above has had Hepatitis C completed/performed. Please assist in updating the patient chart by pulling the Care Everywhere (CE) document. The date of service is 2021.     Thank you,  Hui Ambrose [facility]

## 2024-10-16 NOTE — TELEPHONE ENCOUNTER
Upon review of the In Basket request we were able to locate, review, and update the patient chart as requested for Hepatitis C  and HIV.    Any additional questions or concerns should be emailed to the Practice Liaisons via the appropriate education email address, please do not reply via In Basket.    Thank you  Trinity Messer MA   [facility]

## 2024-10-18 DIAGNOSIS — F90.1 ATTENTION DEFICIT HYPERACTIVITY DISORDER (ADHD), PREDOMINANTLY HYPERACTIVE TYPE: ICD-10-CM

## 2024-10-18 DIAGNOSIS — F41.9 ANXIETY: ICD-10-CM

## 2024-10-18 RX ORDER — DEXTROAMPHETAMINE SACCHARATE, AMPHETAMINE ASPARTATE MONOHYDRATE, DEXTROAMPHETAMINE SULFATE AND AMPHETAMINE SULFATE 6.25; 6.25; 6.25; 6.25 MG/1; MG/1; MG/1; MG/1
25 CAPSULE, EXTENDED RELEASE ORAL EVERY MORNING
Qty: 30 CAPSULE | Refills: 0 | Status: SHIPPED | OUTPATIENT
Start: 2024-10-18

## 2024-10-18 RX ORDER — LORAZEPAM 0.5 MG/1
0.5 TABLET ORAL EVERY 6 HOURS PRN
Qty: 30 TABLET | Refills: 0 | Status: SHIPPED | OUTPATIENT
Start: 2024-10-18

## 2024-10-18 NOTE — TELEPHONE ENCOUNTER
1 [id number] 09/16/2024 09/09/2024 Mixed Amphetamine Salts (Capsule, Extended Release) 30.0 30 25 MG NA JOSE ALFREDO BRANDIE [facility] Commercial Insurance 0 / 0 [place]    1 [id number] 09/03/2024 09/03/2024 Mixed Amphetamine Salts (Capsule, Extended Release) 30.0 30 20 MG NA JOSE ALFREDO BRANDIE [facility] Commercial Insurance 0 / 0 [place]    1 [id number] 09/03/2024 09/03/2024 LORazepam (Tablet) 30.0 7 0.5 MG NA JOSE ALFREDO BRANDIE [facility] Commercial Insurance 0 / 0 [place]    1 [id number] 08/02/2024 07/29/2024 Mixed Amphetamine Salts (Capsule, Extended Release) 30.0 30 20 MG NA JOSE ALFREDO BRANDIE [facility] Commercial Insurance 0 / 0 [place]    1 [id number] 07/03/2024 07/03/2024 Mixed Amphetamine Salts (Capsule, Extended Release) 30.0 30 20 MG NA JOSE ALFREDO BRANDIE [facility] Commercial Insurance 0 / 0 [place]    1 [id number] 06/13/2024 06/13/2024 Mixed Amphetamine Salts (Capsule, Extended Release) 30.0 30 10 MG NA JOSE ALFREDO BRANDIE [facility] Commercial Insurance 0 / 0 [place]    1 [id number] 05/08/2024 05/08/2024 LORazepam (Tablet) 45.0 11 0.5 MG NA VERN ALL[facility]ON [facility] Commercial Insurance 0 / 0 [place]    1 [id number] 01/03/2024 01/03/2024 LORazepam (Tablet) 45.0 11 0.5 MG NA VERN [facility] Commercial Insurance 0 / 0 [place]

## 2024-11-04 ENCOUNTER — OFFICE VISIT (OUTPATIENT)
Dept: GASTROENTEROLOGY | Facility: CLINIC | Age: 39
End: 2024-11-04
Payer: COMMERCIAL

## 2024-11-04 VITALS
SYSTOLIC BLOOD PRESSURE: 118 MMHG | DIASTOLIC BLOOD PRESSURE: 72 MMHG | BODY MASS INDEX: 23.7 KG/M2 | WEIGHT: 160 LBS | HEART RATE: 60 BPM | HEIGHT: 69 IN

## 2024-11-04 DIAGNOSIS — K62.5 RECTAL BLEEDING: ICD-10-CM

## 2024-11-04 DIAGNOSIS — R10.13 DYSPEPSIA: ICD-10-CM

## 2024-11-04 DIAGNOSIS — K62.89 RECTAL PAIN: ICD-10-CM

## 2024-11-04 DIAGNOSIS — K21.9 GASTROESOPHAGEAL REFLUX DISEASE, UNSPECIFIED WHETHER ESOPHAGITIS PRESENT: Primary | ICD-10-CM

## 2024-11-04 PROCEDURE — 99204 OFFICE O/P NEW MOD 45 MIN: CPT | Performed by: INTERNAL MEDICINE

## 2024-11-04 RX ORDER — BISACODYL 5 MG/1
10 TABLET, DELAYED RELEASE ORAL ONCE
Qty: 2 TABLET | Refills: 0 | Status: SHIPPED | OUTPATIENT
Start: 2024-11-04 | End: 2024-11-04

## 2024-11-04 RX ORDER — POLYETHYLENE GLYCOL 3350, SODIUM SULFATE ANHYDROUS, SODIUM BICARBONATE, SODIUM CHLORIDE, POTASSIUM CHLORIDE 236; 22.74; 6.74; 5.86; 2.97 G/4L; G/4L; G/4L; G/4L; G/4L
4000 POWDER, FOR SOLUTION ORAL ONCE
Qty: 4000 ML | Refills: 0 | Status: SHIPPED | OUTPATIENT
Start: 2024-11-04 | End: 2024-11-04

## 2024-11-04 RX ORDER — OMEPRAZOLE 40 MG/1
40 CAPSULE, DELAYED RELEASE ORAL DAILY
Qty: 30 CAPSULE | Refills: 2 | Status: SHIPPED | OUTPATIENT
Start: 2024-11-04 | End: 2025-02-02

## 2024-11-04 NOTE — PATIENT INSTRUCTIONS
"  Patient Education     Acid reflux and GERD in adults   The Basics   Written by the doctors and editors at [facility]   What is acid reflux? -- Acid reflux is when the acid that is normally in the stomach backs up into the esophagus (figure 1). The esophagus is the tube that carries food from the mouth to the stomach.  When acid reflux causes bothersome symptoms or damage, doctors call it \"gastroesophageal reflux disease\" (\"GERD\").  What are the symptoms of acid reflux? -- The most common symptoms are:   Heartburn, which is a burning feeling in the chest   Regurgitation, which is when acid and undigested food flow back into your throat or mouth  Other symptoms might include:   Stomach or chest pain   Trouble swallowing   Raspy voice or sore throat   Unexplained cough   Nausea or vomiting  Is there anything I can do on my own to feel better? -- Yes. You might feel better if you:   Lose weight (if you are overweight).   Raise the head of your bed by 6 to 8 inches - You can do this by putting blocks of wood or rubber under 2 legs of the bed or a foam wedge under the mattress. It is not enough to sleep with your head raised on pillows.   Avoid foods that make your symptoms worse - For some people, these include coffee, chocolate, alcohol, peppermint, and fatty foods. It might help to write down what you ate before having reflux. This can help you figure out if a food is causing your problem.   Stop smoking, if you smoke. Your doctor or nurse can help you try to quit.   Avoid late meals - Lying down with a full stomach can make reflux worse. Try to plan meals for at least 2 to 3 hours before bedtime.   Avoid tight clothing - Some people feel better if they wear comfortable clothing that does not squeeze the stomach area.  How is acid reflux treated? -- There are a few main types of medicines that can help with the symptoms of acid reflux. The most common are antacids, histamine blockers, and proton pump inhibitors (table " "1). All of these medicines work by reducing or blocking stomach acid. But they each do that in a different way.   For mild symptoms, antacids can help, but they work only for a short time. Histamine blockers are stronger and last longer than antacids. You can buy antacids and most histamine blockers without a prescription.   For frequent and more severe symptoms, proton pump inhibitors are the most effective medicines. Some of these medicines are sold without a prescription. But there are other versions that your doctor can prescribe.  Sometimes, medicines cost less if you get them with a doctor's prescription. Other times, non-prescription medicines cost less. If you are worried about cost, ask your pharmacist about ways to pay less for your medicines.  When should I call the doctor? -- While pain or burning in the chest can be a symptom of acid reflux, it can also be a symptom of something more serious like a heart problem. Call for emergency help right away (in the [place] and [place], call 9-1-1) if you think that you might be having a heart attack.  Symptoms of a heart attack might include:   Severe chest pain, pressure, or discomfort with:   Breathing trouble, sweating, upset stomach, or cold and clammy skin   Pain in your arms, back, or jaw   Worse pain with activity like walking up stairs   Fast or irregular heartbeat   Feeling dizzy, faint, or weak  Some people can manage their acid reflux on their own by changing their habits or taking non-prescription medicines. Call your doctor for advice if:   Your symptoms are severe or last a long time.   You cannot seem to control your symptoms.   You have had symptoms for many years.  You should also see a doctor or nurse right away if you:   Have trouble swallowing, or feel as though food gets \"stuck\" on the way down   Lose weight when you are not trying to   Have chest pain   Choke when you eat   Vomit blood, or have bowel movements that are red, black, or look like " "tar  What if my child or teen has acid reflux? -- If your child or teen has acid reflux, take them to see a doctor or nurse. Do not give your child medicines to treat acid reflux without talking to a doctor or nurse.  In children, acid reflux can be caused by a number of problems. Have a doctor or nurse check for these problems before trying any treatments.  All topics are updated as new evidence becomes available and our peer review process is complete.  This topic retrieved from [employer] on: Mar 29, 2024.  Topic 09209 Version 15.0  Release: 32.2.4 - C32.87  © 2024 UpToDate, Inc. and/or its affiliates. All rights reserved.  figure 1: Acid reflux     Acid reflux is when the acid that is normally in the stomach backs up into the esophagus. This can happen if a muscle called the \"lower esophageal sphincter\" relaxes too much.  Graphic 551173 Version 1.0  table 1: Medicines that reduce or block stomach acid  Medicine type  Medicine name examples    Antacids* Calcium carbonate (sample brand names: Maalox, Tums)    Aluminum hydroxide, magnesium hydroxide, and simethicone (sample brand name: Mylanta)   Surface agents Sucralfate (brand name: Carafate)   Histamine blockers¶  Famotidine (brand name: Pepcid)    Cimetidine (brand name: Tagamet)   Proton pump inhibitors Omeprazole (brand name: Prilosec)    Esomeprazole (brand name: Nexium)    Pantoprazole (brand name: Protonix)    Lansoprazole (brand name: Prevacid)    Dexlansoprazole (brand name: Dexilant)    Rabeprazole (brand name: AcipHex)   * Some antacids contain aspirin, which can increase the risk of internal bleeding. Examples of antacids with aspirin include [employer], [employer], and Neutralin. But there are others, too, so it's important to check labels. Talk to your doctor or nurse before taking any medicines that contain aspirin.  ¶ Another histamine blocker, ranitidine (brand name: Zantac), stopped being sold in 2020. That's because it was found to sometimes " contain a substance that could increase a person's risk of cancer if they took a lot of it over time. If you have any of this medicine in your home, stop taking it and throw away any that is left over. Ask your doctor or nurse about what other medicine you should use instead.  Graphic 08568 Version 15.0  Consumer Information Use and Disclaimer   Disclaimer: This generalized information is a limited summary of diagnosis, treatment, and/or medication information. It is not meant to be comprehensive and should be used as a tool to help the user understand and/or assess potential diagnostic and treatment options. It does NOT include all information about conditions, treatments, medications, side effects, or risks that may apply to a specific patient. It is not intended to be medical advice or a substitute for the medical advice, diagnosis, or treatment of a health care provider based on the health care provider's examination and assessment of a patient's specific and unique circumstances. Patients must speak with a health care provider for complete information about their health, medical questions, and treatment options, including any risks or benefits regarding use of medications. This information does not endorse any treatments or medications as safe, effective, or approved for treating a specific patient. UpToDate, Inc. and its affiliates disclaim any warranty or liability relating to this information or the use thereof.The use of this information is governed by the Terms of Use, available at https://www.woltersSecret Salesuwer.com/en/know/clinical-effectiveness-terms. 2024© UpToDate, Inc. and its affiliates and/or licensors. All rights reserved.  Copyright   © 2024 UpToDate, Inc. and/or its affiliates. All rights reserved.      Scheduled date of EGD/colonoscopy (as of today): 12/27/27  Physician performing EGD/colonoscopy: Dr. Herrera  Location of EGD/colonoscopy: [facility]  Desired bowel prep reviewed with patient:  Golytely  Instructions reviewed with patient by: Ashley  Clearances:  none

## 2024-11-04 NOTE — PROGRESS NOTES
"Consultation -  [facility]  Matt Barbour 39 y.o. female MRN: [id number]  @ Encounter: [id number]    ASSESSMENT AND PLAN:      Matt Barbour is a 39 y.o. old female with PMH of MDD, NAYELY presents today with complaints of epigastric discomfort radiating to her chest and bloating that is suspicious for GERD/dyspepsia. She also reports some weight loss, bright red blood per rectum with rectal pain and burning.      #GERD  #Dyspepsia  - Given present of GERD/dyspepsia symptoms, will plan for EGD (biopsy for celiac and H pylori) for further evalution  - Will benefit from trial of PPI pending EGD findings. Omeprazole 40mg BID presecribed with plan to start post procedure  -  Lifestyle modification recommended including avoiding trigger foods, avoiding large meal and elevating head of the bed      #Rectal Bleeding  #Rectal pain and pruritus  - Suspect hemorrhoid in setting of undiagnosed constipation but will obtain colonoscopy to rule out other etiology of lower GI bleeding  - Check CBC  - Colonoscopy with GoLytely/Dulcolax prep ordered        Follow up in 3-6months.  Thank you for this consultation.  ______________________________________________________________________    HPI: 39-year-old female with past medical history of NAYELY, MDD presents with complaints of bloating, epigastric and substernal feeling of \"indigestion\" with mild nausea for the past few years. Symptoms worse with spicy meals, red sauces, alcohol. Has stopped drinking. Smokes only socially. She does consume a few cups of coffee along with celsius energy drinks. No vomiting, melena, nsaid use, new medication, dysphagia. She does report bright red blood per rectum with associated rectal pain, itching and burning. She see blood in the toilet bowel and coating the stool every couple of days. States she has a bowel movement almost daily but has seasons of constipation. Denies straining, family history of colon cancer. Has lost about 30lbs " "over the past year although some of this is in the setting of adjusting diet to fix her GI symptoms. No previous EGD or colonoscopy. Deferred rectal exam today.        REVIEW OF SYSTEMS:    Review of Systems   See above  Historical Information   Past Medical History:   Diagnosis Date    Abnormal Pap smear of cervix     History of urinary tract infection     Past hx of UTI - no current issues    Kidney stone     x1 episode with kidney stone- no surgery needed -passed on her own     PTSD (post-traumatic stress disorder)     Pt states with death of a daughter - is stable at this time    Seasonal allergies      Past Surgical History:   Procedure Laterality Date     SECTION      x1    WY BREAST AUGMENTATION WITH IMPLANT Bilateral 2021    Procedure: AUGMENTATION BREAST;  Surgeon: Marleen Schafer MD;  Location:  MAIN OR;  Service: Plastics    TUBAL LIGATION       Social History   Social History     Substance and Sexual Activity   Alcohol Use Not Currently     Social History     Substance and Sexual Activity   Drug Use Yes    Types: Marijuana    Comment: Medical marijuana card- last use 23      Social History     Tobacco Use   Smoking Status Some Days    Types: Cigarettes    Passive exposure: Past   Smokeless Tobacco Never   Tobacco Comments    Socially smokes --approx 1-2 cigarettes q other day     Family History   Problem Relation Age of Onset    No Known Problems Mother     Alcohol abuse Father     Parkinsonism Father     Bipolar disorder Sister     Anxiety disorder Sister     Alcohol abuse Sister     Sleep apnea Maternal Grandmother     Ovarian cancer Paternal Grandmother     Breast cancer Paternal Grandmother        Meds/Allergies     Not in a hospital admission.  No current facility-administered medications for this visit.    Allergies   Allergen Reactions    Cefaclor Other (See Comments)     hives      Wellbutrin [Bupropion] Hives       Objective     Blood pressure 118/72, pulse 60, height 5' 9\" (1.753 " m), weight 72.6 kg (160 lb), not currently breastfeeding. Body mass index is 23.63 kg/m².      PHYSICAL EXAM:      Physical Exam  Vitals and nursing note reviewed.   Constitutional:       General: She is not in acute distress.     Appearance: She is well-developed.   HENT:      Head: Normocephalic and atraumatic.   Eyes:      Conjunctiva/sclera: Conjunctivae normal.   Cardiovascular:      Rate and Rhythm: Normal rate and regular rhythm.      Heart sounds: No murmur heard.  Pulmonary:      Effort: Pulmonary effort is normal. No respiratory distress.      Breath sounds: Normal breath sounds.   Abdominal:      Palpations: Abdomen is soft.      Tenderness: There is no abdominal tenderness.   Musculoskeletal:         General: No swelling.      Cervical back: Neck supple.   Skin:     General: Skin is warm and dry.      Capillary Refill: Capillary refill takes less than 2 seconds.   Neurological:      Mental Status: She is alert.   Psychiatric:         Mood and Affect: Mood normal.         Lab Results:   No visits with results within 1 Day(s) from this visit.   Latest known visit with results is:   Telephone on 10/14/2024   Component Date Value    HEP C AB 06/02/2017 Negative     HIV Screen 06/02/2017 Non-Reactive        Imaging Studies: I have personally reviewed pertinent imaging studies.    Claire Quiroz MD  Gastroenterology Fellow  Available via [employer] Secure Chat  11/4/2024 12:47 PM

## 2024-11-23 DIAGNOSIS — F90.1 ATTENTION DEFICIT HYPERACTIVITY DISORDER (ADHD), PREDOMINANTLY HYPERACTIVE TYPE: ICD-10-CM

## 2024-11-25 RX ORDER — DEXTROAMPHETAMINE SACCHARATE, AMPHETAMINE ASPARTATE MONOHYDRATE, DEXTROAMPHETAMINE SULFATE AND AMPHETAMINE SULFATE 6.25; 6.25; 6.25; 6.25 MG/1; MG/1; MG/1; MG/1
25 CAPSULE, EXTENDED RELEASE ORAL EVERY MORNING
Qty: 30 CAPSULE | Refills: 0 | Status: SHIPPED | OUTPATIENT
Start: 2024-11-25

## 2024-11-25 NOTE — TELEPHONE ENCOUNTER
1 [id number] 10/18/2024 10/18/2024 Mixed Amphetamine Salts (Capsule, Extended Release) 30.0 30 25 MG NA JOSE ALFREDO BR[facility]IE [facility] AID OF [facility] Commercial Insurance 0 / 0 [place]    1 [id number] 10/18/2024 10/18/2024 LORazepam (Tablet) 30.0 8 0.5 MG NA JOSE ALFREDO [facility] Commercial Insurance 0 / 0 [place]    1 [id number] 09/16/2024 09/09/2024 Mixed Amphetamine Salts (Capsule, Extended Release) 30.0 30 25 MG NA JOSE ALFREDO [facility] Commercial Insurance 0 / 0 [place]    1 [id number] 09/03/2024 09/03/2024 Mixed Amphetamine Salts (Capsule, Extended Release) 30.0 30 20 MG NA JOSE ALFREDO [facility] Commercial Insurance 0 / 0 [place]    1 [id number] 09/03/2024 09/03/2024 LORazepam (Tablet) 30.0 7 0.5 MG NA JOSE ALFREDO [facility] Commercial Insurance 0 / 0 [place]    1 [id number] 08/02/2024 07/29/2024 Mixed Amphetamine Salts (Capsule, Extended Release) 30.0 30 20 MG NA JOSE ALFREDO [facility] Commercial Insurance 0 / 0 [place]    1 [id number] 07/03/2024 07/03/2024 Mixed Amphetamine Salts (Capsule, Extended Release) 30.0 30 20 MG NA JOSE ALFREDO [facility] Commercial Insurance 0 / 0 [place]    1 [id number] 06/13/2024 06/13/2024 Mixed Amphetamine Salts (Capsule, Extended Release) 30.0 30 10 MG NA JOSE ALFREDO [facility] Commercial Insurance 0 / 0 [place]    1 [id number] 05/08/2024 05/08/2024 LORazepam (Tablet) 45.0 11 0.5 MG NA VERN [place]ON R[facility] OF [facility] Commercial Insurance 0 / 0 [place]    1 [id number] 01/03/2024 01/03/2024 LORazepam (Tablet) 45.0 11 0.5 MG NA VERN [facility] R[facility] OF [facility] Commercial Insurance 0 / 0 [place]

## 2024-12-02 ENCOUNTER — OFFICE VISIT (OUTPATIENT)
Dept: FAMILY MEDICINE CLINIC | Facility: CLINIC | Age: 39
End: 2024-12-02
Payer: COMMERCIAL

## 2024-12-02 VITALS
SYSTOLIC BLOOD PRESSURE: 118 MMHG | HEIGHT: 69 IN | OXYGEN SATURATION: 99 % | DIASTOLIC BLOOD PRESSURE: 82 MMHG | WEIGHT: 161 LBS | HEART RATE: 75 BPM | BODY MASS INDEX: 23.85 KG/M2 | TEMPERATURE: 98.9 F

## 2024-12-02 DIAGNOSIS — R20.2 NUMBNESS AND TINGLING OF UPPER EXTREMITY: ICD-10-CM

## 2024-12-02 DIAGNOSIS — R07.89 CHEST HEAVINESS: Primary | ICD-10-CM

## 2024-12-02 DIAGNOSIS — R20.0 NUMBNESS AND TINGLING OF UPPER EXTREMITY: ICD-10-CM

## 2024-12-02 DIAGNOSIS — R20.0 NUMBNESS OF LOWER EXTREMITY: ICD-10-CM

## 2024-12-02 DIAGNOSIS — F32.5 MAJOR DEPRESSION IN REMISSION (HCC): ICD-10-CM

## 2024-12-02 PROCEDURE — 99214 OFFICE O/P EST MOD 30 MIN: CPT | Performed by: NURSE PRACTITIONER

## 2024-12-02 RX ORDER — PREGABALIN 75 MG/1
75 CAPSULE ORAL 2 TIMES DAILY
Qty: 60 CAPSULE | Refills: 1 | Status: SHIPPED | OUTPATIENT
Start: 2024-12-02

## 2024-12-02 NOTE — PROGRESS NOTES
Name: Matt Barbour      : 1985      MRN: [id number]  Encounter Provider: NAGI Gonzalez  Encounter Date: 2024   Encounter department: [facility]  :  Assessment & Plan  Chest heaviness    Orders:    XR chest pa and lateral; Future    Echo complete w/ contrast if indicated; Future    Numbness and tingling of upper extremity    Orders:    XR spine cervical complete 4 or 5 vw non injury; Future    pregabalin (LYRICA) 75 mg capsule; Take 1 capsule (75 mg total) by mouth 2 (two) times a day    Numbness of lower extremity    Orders:    XR spine lumbar minimum 4 views non injury; Future    pregabalin (LYRICA) 75 mg capsule; Take 1 capsule (75 mg total) by mouth 2 (two) times a day    Major depression in remission (HCC)      Orders:    FLUoxetine (PROzac) 20 mg capsule; Take 1 capsule (20 mg total) by mouth daily           History of Present Illness     39 y.o.female presenting with tingling in hands and feet with her finger tips feeling numb at times for awhile. She also developed shortness of breath and chest heaviness 2-3 weeks ago. She reports that she has seen vascular surgeon in the past and was told she had venous insufficiency. She has compression boots but they don't give her any relief. She is a  and she feels fine at work but at night the problems start. She feels like she is unable to take a full breath with the chest heaviness. She does not an underlying anxiety diagnosis so unsure if that is causing the heaviness.      Review of Systems   Constitutional: Negative.    HENT: Negative.     Respiratory:  Positive for chest tightness and shortness of breath.    Cardiovascular: Negative.    Gastrointestinal: Negative.    Genitourinary: Negative.    Musculoskeletal: Negative.    Skin: Negative.    Neurological:  Positive for light-headedness and numbness. Negative for dizziness, weakness and headaches.   Psychiatric/Behavioral: Negative.       Current  "Outpatient Medications on File Prior to Visit   Medication Sig Dispense Refill    amphetamine-dextroamphetamine (ADDERALL XR, 25MG,) 25 MG 24 hr capsule Take 1 capsule (25 mg total) by mouth every morning Max Daily Amount: 25 mg 30 capsule 0    LORazepam (ATIVAN) 0.5 mg tablet Take 1 tablet (0.5 mg total) by mouth every 6 (six) hours as needed for anxiety 30 tablet 0    [DISCONTINUED] bisacodyl (DULCOLAX) 5 mg EC tablet Take 2 tablets (10 mg total) by mouth 1 (one) time for 1 dose 2 tablet 0    [DISCONTINUED] cetirizine (ZyrTEC) 10 mg tablet Take 10 mg by mouth daily      [DISCONTINUED] FLUoxetine (PROzac) 20 mg capsule Take 1 capsule (20 mg total) by mouth daily 90 capsule 3    omeprazole (PriLOSEC) 40 MG capsule Take 1 capsule (40 mg total) by mouth daily 30 capsule 2    polyethylene glycol (Golytely) 4000 mL solution Take 4,000 mL by mouth once for 1 dose Take 4000 mL by mouth once for 1 dose. Use as directed 4000 mL 0    [DISCONTINUED] Cholecalciferol (Vitamin D3) 50 MCG (2000 UT) TABS Take 2,000 Units by mouth daily (Patient not taking: Reported on 12/2/2024)      [DISCONTINUED] gabapentin (NEURONTIN) 100 mg capsule Take 1 capsule (100 mg total) by mouth 2 (two) times a day (Patient not taking: Reported on 9/12/2024) 30 capsule 5    [DISCONTINUED] Multiple Vitamin (multivitamin) tablet Take 1 tablet by mouth daily      [DISCONTINUED] Multiple Vitamins-Minerals (ZINC PO) Take by mouth      [DISCONTINUED] valACYclovir (VALTREX) 1,000 mg tablet Take 1 tablet (1,000 mg total) by mouth 2 (two) times a day for 7 days (Patient not taking: Reported on 12/2/2024) 14 tablet 0     No current facility-administered medications on file prior to visit.         Objective   /82 (BP Location: Left arm, Patient Position: Sitting, Cuff Size: Large)   Pulse 75   Temp 98.9 °F (37.2 °C) (Temporal)   Ht 5' 9\" (1.753 m)   Wt 73 kg (161 lb)   LMP 11/14/2024 (Approximate)   SpO2 99%   BMI 23.78 kg/m² (Reviewed)     Physical " Exam  Vitals reviewed.   Constitutional:       General: She is not in acute distress.     Appearance: She is not ill-appearing.   HENT:      Head: Normocephalic and atraumatic.      Right Ear: External ear normal.      Left Ear: External ear normal.   Eyes:      Extraocular Movements: Extraocular movements intact.      Conjunctiva/sclera: Conjunctivae normal.      Pupils: Pupils are equal, round, and reactive to light.   Cardiovascular:      Rate and Rhythm: Normal rate and regular rhythm.      Chest Wall: PMI is not displaced.      Pulses:           Carotid pulses are 2+ on the right side and 2+ on the left side.       Radial pulses are 2+ on the right side and 2+ on the left side.        Dorsalis pedis pulses are 2+ on the right side and 2+ on the left side.        Posterior tibial pulses are 2+ on the right side and 2+ on the left side.      Heart sounds: Normal heart sounds.   Pulmonary:      Effort: Pulmonary effort is normal.      Breath sounds: Normal breath sounds.   Abdominal:      General: Abdomen is flat. Bowel sounds are normal. There is no distension.      Palpations: Abdomen is soft.      Tenderness: There is no abdominal tenderness.   Musculoskeletal:      Right lower leg: No edema.      Left lower leg: No edema.   Skin:     General: Skin is warm and dry.      Capillary Refill: Capillary refill takes less than 2 seconds.   Neurological:      Mental Status: She is alert and oriented to person, place, and time.   Psychiatric:         Mood and Affect: Mood normal.         Behavior: Behavior normal.

## 2024-12-04 ENCOUNTER — PATIENT MESSAGE (OUTPATIENT)
Dept: FAMILY MEDICINE CLINIC | Facility: CLINIC | Age: 39
End: 2024-12-04

## 2024-12-04 DIAGNOSIS — R20.0 NUMBNESS AND TINGLING OF UPPER EXTREMITY: ICD-10-CM

## 2024-12-04 DIAGNOSIS — R20.2 NUMBNESS AND TINGLING OF UPPER EXTREMITY: ICD-10-CM

## 2024-12-04 DIAGNOSIS — F41.1 GENERALIZED ANXIETY DISORDER: Primary | ICD-10-CM

## 2024-12-09 DIAGNOSIS — G62.9 NEUROPATHY: Primary | ICD-10-CM

## 2024-12-09 LAB
ALBUMIN SERPL-MCNC: 4.4 G/DL (ref 3.5–5.7)
ALP SERPL-CCNC: 42 U/L (ref 35–120)
ALT SERPL-CCNC: 11 U/L
ANION GAP SERPL CALCULATED.3IONS-SCNC: 7 MMOL/L (ref 3–11)
AST SERPL-CCNC: 14 U/L
BILIRUB SERPL-MCNC: 0.5 MG/DL (ref 0.2–1)
BUN SERPL-MCNC: 10 MG/DL (ref 7–25)
CALCIUM SERPL-MCNC: 9.3 MG/DL (ref 8.5–10.5)
CHLORIDE SERPL-SCNC: 103 MMOL/L (ref 100–109)
CHOLEST SERPL-MCNC: 138 MG/DL
CHOLEST/HDLC SERPL: 2 {RATIO}
CO2 SERPL-SCNC: 28 MMOL/L (ref 21–31)
CREAT SERPL-MCNC: 0.75 MG/DL (ref 0.4–1.1)
CYTOLOGY CMNT CVX/VAG CYTO-IMP: NORMAL
GFR/BSA.PRED SERPLBLD CYS-BASED-ARV: 105 ML/MIN/{1.73_M2}
GLUCOSE SERPL-MCNC: 89 MG/DL (ref 65–99)
HDLC SERPL-MCNC: 69 MG/DL (ref 23–92)
LDLC SERPL CALC-MCNC: 59 MG/DL
NONHDLC SERPL-MCNC: 69 MG/DL
POTASSIUM SERPL-SCNC: 3.9 MMOL/L (ref 3.5–5.2)
PROT SERPL-MCNC: 6.7 G/DL (ref 6.3–8.3)
SODIUM SERPL-SCNC: 138 MMOL/L (ref 135–145)
T4 FREE SERPL-MCNC: 0.74 NG/DL (ref 0.61–1.12)
TRIGL SERPL-MCNC: 52 MG/DL
TSH SERPL-ACNC: 1.44 UIU/ML (ref 0.45–5.33)

## 2024-12-09 NOTE — PATIENT COMMUNICATION
Patient called stating the tingling in hands and feet worsened over the weekend, no avail appointment today - but patient wants to get labs done to rule out diabetes.  Faxed her lab orders to [facility] on [address] at Fax  [phone]-  please advise if Keisha would like to re-eval.  Thank you!

## 2024-12-10 ENCOUNTER — RESULTS FOLLOW-UP (OUTPATIENT)
Dept: FAMILY MEDICINE CLINIC | Facility: CLINIC | Age: 39
End: 2024-12-10

## 2024-12-27 ENCOUNTER — TELEPHONE (OUTPATIENT)
Dept: GASTROENTEROLOGY | Facility: CLINIC | Age: 39
End: 2024-12-27

## 2024-12-30 ENCOUNTER — HOSPITAL ENCOUNTER (OUTPATIENT)
Dept: NON INVASIVE DIAGNOSTICS | Facility: HOSPITAL | Age: 39
Discharge: HOME | End: 2024-12-30
Payer: COMMERCIAL

## 2024-12-30 VITALS
WEIGHT: 161 LBS | HEART RATE: 62 BPM | HEIGHT: 69 IN | BODY MASS INDEX: 23.85 KG/M2 | DIASTOLIC BLOOD PRESSURE: 75 MMHG | SYSTOLIC BLOOD PRESSURE: 116 MMHG

## 2024-12-30 DIAGNOSIS — R07.89 CHEST HEAVINESS: ICD-10-CM

## 2024-12-30 LAB
AORTIC ROOT: 3.2 CM
AV LVOT PEAK GRADIENT: 5 MMHG
AV PEAK GRADIENT: 6 MMHG
BSA FOR ECHO PROCEDURE: 1.88 M2
DOP CALC LVOT AREA: 3.8 CM2
DOP CALC LVOT DIAMETER: 2.2 CM
E WAVE DECELERATION TIME: 121 MS
E/A RATIO: 1.07
FRACTIONAL SHORTENING: 33 (ref 28–44)
INTERVENTRICULAR SEPTUM IN DIASTOLE (PARASTERNAL SHORT AXIS VIEW): 0.7 CM
INTERVENTRICULAR SEPTUM: 0.7 CM (ref 0.6–1.1)
LAAS-AP2: 23.8 CM2
LAAS-AP4: 17.3 CM2
LEFT ATRIUM AREA SYSTOLE SINGLE PLANE A4C: 18.3 CM2
LEFT ATRIUM SIZE: 4.1 CM
LEFT ATRIUM VOLUME (MOD BIPLANE): 62 ML
LEFT ATRIUM VOLUME INDEX (MOD BIPLANE): 34 ML/M2
LEFT INTERNAL DIMENSION IN SYSTOLE: 4 CM (ref 2.1–4)
LEFT VENTRICULAR INTERNAL DIMENSION IN DIASTOLE: 6 CM (ref 3.5–6)
LEFT VENTRICULAR POSTERIOR WALL IN END DIASTOLE: 0.7 CM
LEFT VENTRICULAR STROKE VOLUME: 106 ML
LVSV (TEICH): 106 ML
MV E'TISSUE VEL-LAT: 10 CM/S
MV E'TISSUE VEL-SEP: 12 CM/S
MV PEAK A VEL: 0.56 M/S
MV PEAK E VEL: 60 CM/S
MV STENOSIS PRESSURE HALF TIME: 35 MS
MV VALVE AREA P 1/2 METHOD: 6.29
PV PEAK GRADIENT: 3 MMHG
RIGHT ATRIUM AREA SYSTOLE A4C: 18.2 CM2
RIGHT VENTRICLE ID DIMENSION: 3.5 CM
SL CV LEFT ATRIUM LENGTH A2C: 5.7 CM
SL CV LV EF: 55
SL CV PED ECHO LEFT VENTRICLE DIASTOLIC VOLUME (MOD BIPLANE) 2D: 177 ML
SL CV PED ECHO LEFT VENTRICLE SYSTOLIC VOLUME (MOD BIPLANE) 2D: 71 ML
TR MAX PG: 15 MMHG
TR PEAK VELOCITY: 1.9 M/S
TRICUSPID ANNULAR PLANE SYSTOLIC EXCURSION: 2.1 CM
TRICUSPID VALVE PEAK REGURGITATION VELOCITY: 1.92 M/S

## 2024-12-30 PROCEDURE — 93306 TTE W/DOPPLER COMPLETE: CPT

## 2024-12-30 PROCEDURE — 93306 TTE W/DOPPLER COMPLETE: CPT | Performed by: INTERNAL MEDICINE

## 2024-12-31 ENCOUNTER — RESULTS FOLLOW-UP (OUTPATIENT)
Dept: FAMILY MEDICINE CLINIC | Facility: CLINIC | Age: 39
End: 2024-12-31

## 2025-01-03 DIAGNOSIS — F90.1 ATTENTION DEFICIT HYPERACTIVITY DISORDER (ADHD), PREDOMINANTLY HYPERACTIVE TYPE: ICD-10-CM

## 2025-01-03 DIAGNOSIS — F41.9 ANXIETY: ICD-10-CM

## 2025-01-03 RX ORDER — DEXTROAMPHETAMINE SACCHARATE, AMPHETAMINE ASPARTATE MONOHYDRATE, DEXTROAMPHETAMINE SULFATE AND AMPHETAMINE SULFATE 6.25; 6.25; 6.25; 6.25 MG/1; MG/1; MG/1; MG/1
25 CAPSULE, EXTENDED RELEASE ORAL EVERY MORNING
Qty: 30 CAPSULE | Refills: 0 | Status: SHIPPED | OUTPATIENT
Start: 2025-01-03 | End: 2025-01-06 | Stop reason: SDUPTHER

## 2025-01-03 RX ORDER — LORAZEPAM 0.5 MG/1
0.5 TABLET ORAL EVERY 6 HOURS PRN
Qty: 30 TABLET | Refills: 0 | Status: SHIPPED | OUTPATIENT
Start: 2025-01-03

## 2025-01-03 NOTE — TELEPHONE ENCOUNTER
1 [id number] 12/02/2024 12/02/2024 Pregabalin (Capsule) 60.0 30 75 MG NA JOSE ALFREDO BR[facility]IE [facility] AID OF [facility] Commercial Insurance 0 / 1 [place]    1 [id number] 11/25/2024 11/25/2024 Mixed Amphetamine Salts (Capsule, Extended Release) 30.0 30 25 MG NA JOSE ALFREDO [facility] Commercial Insurance 0 / 0 [place]    1 [id number] 10/18/2024 10/18/2024 Mixed Amphetamine Salts (Capsule, Extended Release) 30.0 30 25 MG NA JOSE ALFREDO [facility] Commercial Insurance 0 / 0 [place]    1 [id number] 10/18/2024 10/18/2024 LORazepam (Tablet) 30.0 8 0.5 MG NA JOSE ALFREDO [facility]IE [facility] OF [facility] Commercial Insurance 0 / 0 [place]    1 [id number] 09/16/2024 09/09/2024 Mixed Amphetamine Salts (Capsule, Extended Release) 30.0 30 25 MG NA JOSE ALFREDO [facility]IE [facility] Commercial Insurance 0 / 0 [place]    1 [id number] 09/03/2024 09/03/2024 Mixed Amphetamine Salts (Capsule, Extended Release) 30.0 30 20 MG NA JOSE ALFREDO [facility]IE [facility] Commercial Insurance 0 / 0 [place]    1 [id number] 09/03/2024 09/03/2024 LORazepam (Tablet) 30.0 7 0.5 MG NA JOSE ALFREDO BR[facility]IE [facility] Commercial Insurance 0 / 0 [place]    1 [id number] 08/02/2024 07/29/2024 Mixed Amphetamine Salts (Capsule, Extended Release) 30.0 30 20 MG NA JOSE ALFREDO BR[facility]IE [facility] Commercial Insurance 0 / 0 [place]    1 [id number] 07/03/2024 07/03/2024 Mixed Amphetamine Salts (Capsule, Extended Release) 30.0 30 20 MG NA JOSE ALFREDO BRANDIE R[facility] A[facility] OF [facility] Commercial Insurance 0 / 0 [place]    1 [id number] 06/13/2024 06/13/2024 Mixed Amphetamine Salts (Capsule, Extended Release) 30.0 30 10 MG NA JOSE ALFREDO BRANDIE RITE A[facility] OF [facility] Commercial Insurance 0 / 0 [place]

## 2025-01-06 DIAGNOSIS — F90.1 ATTENTION DEFICIT HYPERACTIVITY DISORDER (ADHD), PREDOMINANTLY HYPERACTIVE TYPE: ICD-10-CM

## 2025-01-06 NOTE — TELEPHONE ENCOUNTER
This is  a different pharmacy     1 [id number] 12/02/2024 12/02/2024 Pregabalin (Capsule) 60.0 30 75 MG NA [facility] [employer] [facility] Commercial Insurance 0 / 1 [place]    1 [id number] 11/25/2024 11/25/2024 Mixed Amphetamine Salts (Capsule, Extended Release) 30.0 30 25 MG NA [facility]EDO [employer]  [facility] 0 / 0 [place]    1 [id number] 10/18/2024 10/18/2024 Mixed Amphetamine Salts (Capsule, Extended Release) 30.0 30 25 MG NA JOSE ALFREDO [employer]  [place] Commercial Insurance 0 / 0 [place]

## 2025-01-07 RX ORDER — DEXTROAMPHETAMINE SACCHARATE, AMPHETAMINE ASPARTATE MONOHYDRATE, DEXTROAMPHETAMINE SULFATE AND AMPHETAMINE SULFATE 6.25; 6.25; 6.25; 6.25 MG/1; MG/1; MG/1; MG/1
25 CAPSULE, EXTENDED RELEASE ORAL EVERY MORNING
Qty: 30 CAPSULE | Refills: 0 | Status: SHIPPED | OUTPATIENT
Start: 2025-01-07

## 2025-02-06 DIAGNOSIS — F32.5 MAJOR DEPRESSION IN REMISSION (HCC): ICD-10-CM

## 2025-02-06 DIAGNOSIS — F41.9 ANXIETY: ICD-10-CM

## 2025-02-06 DIAGNOSIS — F90.1 ATTENTION DEFICIT HYPERACTIVITY DISORDER (ADHD), PREDOMINANTLY HYPERACTIVE TYPE: ICD-10-CM

## 2025-02-06 RX ORDER — LORAZEPAM 0.5 MG/1
0.5 TABLET ORAL EVERY 6 HOURS PRN
Qty: 30 TABLET | Refills: 0 | Status: SHIPPED | OUTPATIENT
Start: 2025-02-06

## 2025-02-06 RX ORDER — DEXTROAMPHETAMINE SACCHARATE, AMPHETAMINE ASPARTATE MONOHYDRATE, DEXTROAMPHETAMINE SULFATE AND AMPHETAMINE SULFATE 6.25; 6.25; 6.25; 6.25 MG/1; MG/1; MG/1; MG/1
25 CAPSULE, EXTENDED RELEASE ORAL EVERY MORNING
Qty: 30 CAPSULE | Refills: 0 | Status: SHIPPED | OUTPATIENT
Start: 2025-02-06

## 2025-02-06 NOTE — TELEPHONE ENCOUNTER
1 [id number] 01/06/2025 01/03/2025 Mixed Amphetamine Salts (Capsule, Extended Release) 30.0 30 25 MG NA [facility] Commercial Insurance 0 / 0 [place]    1 [id number] 01/03/2025 01/03/2025 LORazepam (Tablet) 30.0 7 0.5 MG NA [facility] Commercial Insurance 0 / 0 [place]    1 [id number] 12/02/2024 12/02/2024 Pregabalin (Capsule) 60.0 30 75 MG NA JOSE ALFREDO [facility] Commercial Insurance 0 / 1 [place]    1 [id number] 11/25/2024 11/25/2024 Mixed Amphetamine Salts (Capsule, Extended Release) 30.0 30 25 MG NA JOSE ALFREDO [facility] Commercial Insurance 0 / 0 [place]    1 [id number] 10/18/2024 10/18/2024 Mixed Amphetamine Salts (Capsule, Extended Release) 30.0 30 25 MG NA JOSE ALFREDO [facility] Commercial Insurance 0 / 0 [place]    1 [id number] 10/18/2024 10/18/2024 LORazepam (Tablet) 30.0 8 0.5 MG NA JOSE ALFREDO [facility] Commercial Insurance 0 / 0 [place]    1 [id number] 09/16/2024 09/09/2024 Mixed Amphetamine Salts (Capsule, Extended Release) 30.0 30 25 MG NA JOSE ALFREDO [facility] Commercial Insurance 0 / 0 [place]    1 [id number] 09/03/2024 09/03/2024 Mixed Amphetamine Salts (Capsule, Extended Release) 30.0 30 20 MG NA JOSE ALFREDO [facility] Commercial Insurance 0 / 0 [place]    1 [id number] 09/03/2024 09/03/2024 LORazepam (Tablet) 30.0 7 0.5 MG NA JOSE ALFREDO [facility] Commercial Insurance 0 / 0 [place]    1 [id number] 08/02/2024 07/29/2024 Mixed Amphetamine Salts (Capsule, Extended Release) 30.0 30 20 MG NA JOSE ALFREDO [facility] Commercial Insurance 0 / 0 [place]

## 2025-02-06 NOTE — TELEPHONE ENCOUNTER
1 [id number] 01/06/2025 01/03/2025 Mixed Amphetamine Salts (Capsule, Extended Release) 30.0 30 25 MG NA [facility] Commercial Insurance 0 / 0 [place]    1 [id number] 01/03/2025 01/03/2025 LORazepam (Tablet) 30.0 7 0.5 MG NA [facility] Commercial Insurance 0 / 0 [place]    1 [id number] 12/02/2024 12/02/2024 Pregabalin (Capsule) 60.0 30 75 MG NA JOSE ALFREDO [facility] Commercial Insurance 0 / 1 [place]    1 [id number] 11/25/2024 11/25/2024 Mixed Amphetamine Salts (Capsule, Extended Release) 30.0 30 25 MG NA JOSE ALFREDO [facility] Commercial Insurance 0 / 0 [place]    1 [id number] 10/18/2024 10/18/2024 Mixed Amphetamine Salts (Capsule, Extended Release) 30.0 30 25 MG NA JOSE ALFREDO [facility] Commercial Insurance 0 / 0 [place]    1 [id number] 10/18/2024 10/18/2024 LORazepam (Tablet) 30.0 8 0.5 MG NA JOSE ALFREDO [facility] Commercial Insurance 0 / 0 [place]    1 [id number] 09/16/2024 09/09/2024 Mixed Amphetamine Salts (Capsule, Extended Release) 30.0 30 25 MG NA JOSE ALFREDO [facility] Commercial Insurance 0 / 0 [place]    1 [id number] 09/03/2024 09/03/2024 Mixed Amphetamine Salts (Capsule, Extended Release) 30.0 30 20 MG NA JOSE ALFREDO [facility] Commercial Insurance 0 / 0 [place]    1 [id number] 09/03/2024 09/03/2024 LORazepam (Tablet) 30.0 7 0.5 MG NA JOSE ALFREDO [facility] Commercial Insurance 0 / 0 [place]    1 [id number] 08/02/2024 07/29/2024 Mixed Amphetamine Salts (Capsule, Extended Release) 30.0 30 20 MG NA JOSE ALFREDO [facility] Commercial Insurance 0 / 0 [place]

## 2025-02-07 ENCOUNTER — TELEPHONE (OUTPATIENT)
Dept: CARDIOLOGY CLINIC | Facility: CLINIC | Age: 40
End: 2025-02-07

## 2025-03-11 DIAGNOSIS — F41.9 ANXIETY: ICD-10-CM

## 2025-03-11 DIAGNOSIS — F90.1 ATTENTION DEFICIT HYPERACTIVITY DISORDER (ADHD), PREDOMINANTLY HYPERACTIVE TYPE: ICD-10-CM

## 2025-03-11 RX ORDER — DEXTROAMPHETAMINE SACCHARATE, AMPHETAMINE ASPARTATE MONOHYDRATE, DEXTROAMPHETAMINE SULFATE AND AMPHETAMINE SULFATE 6.25; 6.25; 6.25; 6.25 MG/1; MG/1; MG/1; MG/1
25 CAPSULE, EXTENDED RELEASE ORAL EVERY MORNING
Qty: 30 CAPSULE | Refills: 0 | Status: SHIPPED | OUTPATIENT
Start: 2025-03-11

## 2025-03-11 RX ORDER — LORAZEPAM 0.5 MG/1
0.5 TABLET ORAL EVERY 6 HOURS PRN
Qty: 30 TABLET | Refills: 0 | Status: SHIPPED | OUTPATIENT
Start: 2025-03-11

## 2025-03-11 NOTE — TELEPHONE ENCOUNTER
1 [id number] 02/06/2025 02/06/2025 LORazepam (Tablet) 30.0 7 0.5 MG NA [facility] AL[facility]EDO [facility] Commercial Insurance 0 / 0 [place]    1 [id number] 02/06/2025 02/06/2025 Mixed Amphetamine Salts (Capsule, Extended Release) 30.0 30 25 MG NA JOSE ALFREDO [facility] Commercial Insurance 0 / 0 [place]    1 [id number] 01/06/2025 01/03/2025 Mixed Amphetamine Salts (Capsule, Extended Release) 30.0 30 25 MG NA [facility] Commercial Insurance 0 / 0 [place]    1 [id number] 01/03/2025 01/03/2025 LORazepam (Tablet) 30.0 7 0.5 MG NA [facility] Commercial Insurance 0 / 0 [place]    1 [id number] 12/02/2024 12/02/2024 Pregabalin (Capsule) 60.0 30 75 MG NA JOSE ALFREDO [facility] Commercial Insurance 0 / 1 [place]    1 [id number] 11/25/2024 11/25/2024 Mixed Amphetamine Salts (Capsule, Extended Release) 30.0 30 25 MG NA JOSE ALFREDO [facility] Commercial Insurance 0 / 0 [place]    1 [id number] 10/18/2024 10/18/2024 LORazepam (Tablet) 30.0 8 0.5 MG NA JOSE ALFREDO [facility] Commercial Insurance 0 / 0 [place]    1 [id number] 10/18/2024 10/18/2024 Mixed Amphetamine Salts (Capsule, Extended Release) 30.0 30 25 MG NA JOSE ALFREDO [facility] Commercial Insurance 0 / 0 [place]    1 [id number] 09/16/2024 09/09/2024 Mixed Amphetamine Salts (Capsule, Extended Release) 30.0 30 25 MG NA JOSE ALFREDO [facility] Commercial Insurance 0 / 0 [place]    1 [id number] 09/03/2024 09/03/2024 LORazepam (Tablet) 30.0 7 0.5 MG NA JOSE ALFREDO [facility] Commercial Insurance 0 / 0 [place]

## 2025-04-02 DIAGNOSIS — F90.1 ATTENTION DEFICIT HYPERACTIVITY DISORDER (ADHD), PREDOMINANTLY HYPERACTIVE TYPE: ICD-10-CM

## 2025-04-02 RX ORDER — DEXTROAMPHETAMINE SACCHARATE, AMPHETAMINE ASPARTATE MONOHYDRATE, DEXTROAMPHETAMINE SULFATE AND AMPHETAMINE SULFATE 7.5; 7.5; 7.5; 7.5 MG/1; MG/1; MG/1; MG/1
30 CAPSULE, EXTENDED RELEASE ORAL EVERY MORNING
Start: 2025-04-02

## 2025-04-07 ENCOUNTER — TELEPHONE (OUTPATIENT)
Age: 40
End: 2025-04-07

## 2025-04-12 DIAGNOSIS — F90.1 ATTENTION DEFICIT HYPERACTIVITY DISORDER (ADHD), PREDOMINANTLY HYPERACTIVE TYPE: ICD-10-CM

## 2025-04-14 RX ORDER — DEXTROAMPHETAMINE SACCHARATE, AMPHETAMINE ASPARTATE MONOHYDRATE, DEXTROAMPHETAMINE SULFATE AND AMPHETAMINE SULFATE 7.5; 7.5; 7.5; 7.5 MG/1; MG/1; MG/1; MG/1
30 CAPSULE, EXTENDED RELEASE ORAL EVERY MORNING
Qty: 30 CAPSULE | Refills: 0 | Status: SHIPPED | OUTPATIENT
Start: 2025-04-14

## 2025-04-14 NOTE — TELEPHONE ENCOUNTER
1 [id number] 03/11/2025 03/11/2025 Mixed Amphetamine Salts (Capsule, Extended Release) 30.0 30 25 MG NA [facility] [place]    1 [id number] 03/11/2025 03/11/2025 LORazepam (Tablet) 30.0 7 0.5 MG NA JOSE ALFREDO [facility] Commercial Insurance 0 / 0 [place]    1 [id number] 02/06/2025 02/06/2025 Mixed Amphetamine Salts (Capsule, Extended Release) 30.0 30 25 MG NA JOSE ALFREDO [facility] Commercial Insurance 0 / 0 [place]    1 [id number] 02/06/2025 02/06/2025 LORazepam (Tablet) 30.0 7 0.5 MG NA JOSE ALFREDO [facility] Commercial Insurance 0 / 0 [place]    1 [id number] 01/06/2025 01/03/2025 Mixed Amphetamine Salts (Capsule, Extended Release) 30.0 30 25 MG NA [facility] Commercial Insurance 0 / 0 [place]    1 [id number] 01/03/2025 01/03/2025 LORazepam (Tablet) 30.0 7 0.5 MG NA [facility]OB [facility]T [facility] Commercial Insurance 0 / 0 [place]    1 [id number] 12/02/2024 12/02/2024 Pregabalin (Capsule) 60.0 30 75 MG NA JOSE ALFREDO BR[facility] Commercial Insurance 0 / 1 [place]    1 [id number] 11/25/2024 11/25/2024 Mixed Amphetamine Salts (Capsule, Extended Release) 30.0 30 25 MG NA JOSE ALFREDO [facility] Commercial Insurance 0 / 0 [place]    1 [id number] 10/18/2024 10/18/2024 Mixed Amphetamine Salts (Capsule, Extended Release) 30.0 30 25 MG NA [facility] / 0 [place]    1 [id number] 10/18/2024 10/18/2024 LORazepam (Tablet) 30.0 8 0.5 MG NA JOSE ALFREDO [facility] Commercial Insurance 0 / 0 [place]

## 2025-05-22 DIAGNOSIS — F32.5 MAJOR DEPRESSION IN REMISSION (HCC): ICD-10-CM

## 2025-05-22 DIAGNOSIS — F41.9 ANXIETY: ICD-10-CM

## 2025-05-22 DIAGNOSIS — F90.1 ATTENTION DEFICIT HYPERACTIVITY DISORDER (ADHD), PREDOMINANTLY HYPERACTIVE TYPE: ICD-10-CM

## 2025-05-22 RX ORDER — LORAZEPAM 0.5 MG/1
0.5 TABLET ORAL EVERY 6 HOURS PRN
Qty: 30 TABLET | Refills: 0 | Status: SHIPPED | OUTPATIENT
Start: 2025-05-22

## 2025-05-22 RX ORDER — DEXTROAMPHETAMINE SACCHARATE, AMPHETAMINE ASPARTATE MONOHYDRATE, DEXTROAMPHETAMINE SULFATE AND AMPHETAMINE SULFATE 7.5; 7.5; 7.5; 7.5 MG/1; MG/1; MG/1; MG/1
30 CAPSULE, EXTENDED RELEASE ORAL EVERY MORNING
Qty: 30 CAPSULE | Refills: 0 | Status: SHIPPED | OUTPATIENT
Start: 2025-05-22

## 2025-05-22 NOTE — TELEPHONE ENCOUNTER
1 [id number] ** 04/14/2025 04/14/2025 Mixed Amphetamine Salts (Capsule, Extended Release) 30.0 30 30 MG NA ELSPETH BLACK-BRITTON [facility] [place] Commercial Insurance 0 / 0 [place]    1 [id number] ** 03/11/2025 03/11/2025 Mixed Amphetamine Salts (Capsule, Extended Release) 30.0 30 25 MG NA JOSE ALFREDO BR[facility]IE [facility] [place] Commercial Insurance 0 / 0 [place]    1 [id number] ** 03/11/2025 03/11/2025 LORazepam (Tablet) 30.0 7 0.5 MG NA [facility] Commercial Insuranc

## 2025-05-22 NOTE — TELEPHONE ENCOUNTER
1 [id number] ** 04/14/2025 04/14/2025 Mixed Amphetamine Salts (Capsule, Extended Release) 30.0 30 30 MG NA ELSPETH BLACK-BRITTON [facility] [place] Commercial Insurance 0 / 0 [place]    1 [id number] ** 03/11/2025 03/11/2025 Mixed Amphetamine Salts (Capsule, Extended Release) 30.0 30 25 MG NA JOSE ALFREDO [facility]IE [facility] [place] Commercial Insurance 0 / 0 [place]    1 [id number] ** 03/11/2025 03/11/2025 LORazepam (Tablet) 30.0 7 0.5 MG NA [facility] AFLREDO [facility] [place] [facility] Commercial Insuranc

## 2025-05-23 NOTE — TELEPHONE ENCOUNTER
Lm advising to schedule a physical this summer with PCP in order to keep receiving timely refills.

## 2025-05-30 ENCOUNTER — OFFICE VISIT (OUTPATIENT)
Dept: NEUROLOGY | Facility: CLINIC | Age: 40
End: 2025-05-30
Payer: COMMERCIAL

## 2025-05-30 VITALS
TEMPERATURE: 98.9 F | BODY MASS INDEX: 23.82 KG/M2 | DIASTOLIC BLOOD PRESSURE: 62 MMHG | OXYGEN SATURATION: 100 % | HEIGHT: 69 IN | WEIGHT: 160.8 LBS | SYSTOLIC BLOOD PRESSURE: 130 MMHG | HEART RATE: 75 BPM

## 2025-05-30 DIAGNOSIS — M79.10 MYALGIA: ICD-10-CM

## 2025-05-30 DIAGNOSIS — H53.2 DIPLOPIA: Primary | ICD-10-CM

## 2025-05-30 DIAGNOSIS — R20.2 PARESTHESIA: ICD-10-CM

## 2025-05-30 PROCEDURE — 99204 OFFICE O/P NEW MOD 45 MIN: CPT | Performed by: NURSE PRACTITIONER

## 2025-05-30 NOTE — PROGRESS NOTES
Name: Mtat Barbour      : 1985      MRN: [id number]  Encounter Provider: NAGI Fontana  Encounter Date: 2025   Encounter department: [facility] [place]  :  Assessment & Plan  Diplopia  Patient has been noting diplopia for several months, tends to occur later in the day.  She has also been noting she is dropping items more frequently, she notes more effort to swallow at times, no choking, no jaw fatigue or shortness of breath.  No slurred speech.  Can check acetylcholine receptor antibodies and MRI brain due to diplopia.    Orders:    MRI brain and orbits wo and w contrast; Future    Acetylcholine Receptor (AChR) Ab, with Reflex to MuSK Ab; Future    Paresthesia  Patient with several year history of numbness and tingling in the feet, intermittently up to the knees along with the hands.  Over the past 6 months the symptoms have become more constant and she has been noting muscle aching in the arms and legs and a weak sensation of her legs and arms, hand weakness.  At times her weakness feels somewhat fatigable.     On exam she has normal muscle strength testing other than in the left quad.  She did have diminished sensation in the feet. Normal reflexes, no myelopathic features.     Given her numbness tingling and myalgias we will plan to obtain EMG and lab workup.     Orders:    EMG 1 Upper/1 Lower Neuropathy; Future    Vitamin B12; Future    Vitamin B1, whole blood; Future    Folate; Future    Sedimentation rate, automated; Future    Protein electrophoresis, serum; Future    Anti-neutrophilic cytoplasmic antibody; Future    BERNIE by IFA Reflex for Rheumatologist; Future    Sjogren's Antibodies; Future    Myalgia  Reports muscle aching in the extremities along with some fatigable weakness.  Plan to check CK, sed rate, EMG.  Orders:    CK; Future    Aldolase; Future          History of Present Illness   HPI   Patient is a 39-year-old female with past medical history of  anxiety/depression, possible inflammatory or psoriatic arthritis previously seen by rheumatology who presents for evaluation of numbness and tingling.    Several years ago she started to note numbness tingling in the feet up to the knees along with numbness/tingling in the hands.   Over the past 6 months she has noted worsening symptoms-becoming more constant. More muscles aches in the arms and legs.   She feels a weak sensation in her legs, some mild weak feel in the arms-get fatigue when trying to do hair.   No clear trigger to symptoms.   Feels fatigued at times as well.   In the past 4 months she has felt some more balance issues, loses her balance more often. No falls.     She has some left sided neck pain for at least 4 years, can radiate down the left arm at times.   Low back pain at times has radiated down the left leg a times.     She can get cramping in her feet and toes, has noted some muscle twitching in the eyes and legs very occasionally.     She has been dropping items more often. She has been having some diplopia-later in the day , if she closes one eye it will improve, typically horizontal.  She has difficulty with wording recently, no slurred. She feels she has take more effort to swallow at times. No choking episodes.  She does have TMJ which can interfere with ability to chew, some fatigue at times.     She did see rheumatology in the past due to hand and feet swelling/joint pain. Is still ongoing. ?inflammatory arthritis vs psoriatic arthritis, never given exact diagnosis, was never started on DMARD. Lab work up was normal.     No SOB. She does feel dry mouth, dry eye.     Saw her eye dr last year, hasn't seen them since diplopia started.     Reports some incomplete emptying recently as well no bowel changes.     Paternal grandmother with MS, dad with parkinsons.   Quit smoking 11 years ago, no ETOH use.  Works as .     Prior workup:  Labs 12/2024 cbc, lipid panel, CMP, TSH  normal  10/2023 BERNIE, lyme normal/negative  11/2021 celiac, HLA-B27, CRP, sed rate, rheumatoid factor, SPEP, BERNIE, ACE, CCP-normal/negative  Review of Systems   Neurological:  Positive for dizziness (while standing to long), speech difficulty, weakness (Hands, arm, legs), numbness (feet and hands) and headaches (Left side back of the head for 4 years).    I have personally reviewed the MA's review of systems and made changes as necessary.         Objective   There were no vitals taken for this visit.    Physical Exam  Constitutional:       General: She is awake.   HENT:      Right Ear: Hearing normal.      Left Ear: Hearing normal.     Eyes:      General: Lids are normal.      Extraocular Movements: Extraocular movements intact.      Pupils: Pupils are equal, round, and reactive to light.       Neurological:      Mental Status: She is alert.      Coordination: Romberg sign negative.      Deep Tendon Reflexes:      Reflex Scores:       Bicep reflexes are 2+ on the right side and 2+ on the left side.       Brachioradialis reflexes are 2+ on the right side and 2+ on the left side.       Patellar reflexes are 2+ on the right side and 2+ on the left side.       Achilles reflexes are 2+ on the right side and 2+ on the left side.    Psychiatric:         Speech: Speech normal.       Neurological Exam  Mental Status  Awake and alert. Oriented to person, place, time and situation. Speech is normal. Language is fluent with no aphasia.    Cranial Nerves  CN II: Visual fields full to confrontation.  CN III, IV, VI: Extraocular movements intact bilaterally. Normal lids and orbits bilaterally. Pupils equal round and reactive to light bilaterally. Reports diplopia in the left lateral vision, no ptosis noted.  CN V:  Right: Facial sensation is normal.  Left: Facial sensation is normal on the left.  CN VII:  Right: There is no facial weakness.  Left: There is no facial weakness.  CN VIII:  Right: Hearing is normal.  Left: Hearing is  normal.  CN IX, X: Palate elevates symmetrically  CN XI:  Right: Trapezius strength is normal.  Left: Trapezius strength is normal.  CN XII: Tongue midline without atrophy or fasciculations.  Able to puff out cheeks, close eyes, push tongue to the side of the mouth with resistance  .    Motor  Normal muscle bulk throughout.                                               Right                     Left  Neck flexion                           5                           Neck extension                      5                           Elbow flexion                         5                          5  Elbow extension                    5                          5  Wrist flexion                           5                          5  Wrist extension                      5                          5  Finger abduction                    5                          5  Hip flexion                              5                          5  Knee flexion                           5                          5  Knee extension                      5                          5-  Ankle inversion                      5                          5  Ankle eversion                       5                          5  Plantarflexion                         5                          5  Dorsiflexion                            5                          5    Sensory  Light touch is normal in upper and lower extremities. Pinprick abnormality: Normal in bilateral UE, diminished to the ankles in b/l LE  . Temperature abnormality: Normal in bilateral UE, diminished to the ankles in b/l LE  . Vibration is normal in upper and lower extremities. Proprioception is normal in upper and lower extremities.     Reflexes                                            Right                      Left  Brachioradialis                    2+                         2+  Biceps                                 2+                         2+  Patellar                                 2+                         2+  Achilles                                2+                         2+  Right Plantar: downgoing  Left Plantar: downgoing    Right pathological reflexes: Diana's absent. Ankle clonus absent.  Left pathological reflexes: Diana's absent. Ankle clonus absent.    Coordination  Right: Finger-to-nose normal.Left: Finger-to-nose normal.    Gait  Casual gait is normal including stance, stride, and arm swing.Normal toe walking. Normal heel walking. Normal tandem gait. Romberg is absent. Able to rise from chair without using arms.

## 2025-06-02 ENCOUNTER — TELEPHONE (OUTPATIENT)
Age: 40
End: 2025-06-02

## 2025-06-02 LAB
ACHR BIND AB SER-SCNC: 0.07 NMOL/L (ref 0–0.24)
ACHR BLOCK AB SER-ACNC: 13 % (ref 0–25)
ACHR MOD AB/ACHR TOTAL SFR SER: 5 % (ref 0–45)
ALBUMIN SERPL ELPH-MCNC: 4.4 G/DL (ref 4.1–5.1)
ALBUMIN/GLOB SERPL: 1.8 {RATIO}
ALDOLASE SERPL-CCNC: 3.2 U/L (ref 3.3–10.3)
ALPHA1 GLOB SERPL ELPH-MCNC: 0.2 G/DL (ref 0.1–0.2)
ALPHA2 GLOB SERPL ELPH-MCNC: 0.5 G/DL (ref 0.6–0.9)
ANA NUCLEOLAR TITR SER: 160 TITER
ANA SER QL IF: PRESENT
B-GLOBULIN SERPL ELPH-MCNC: 0.8 G/DL (ref 0.6–1)
C-ANCA TITR SER IF: 4 RU/ML
CK SERPL-CCNC: 109 U/L (ref 30–223)
DSDNA IGG SERPL IA-ACNC: NEGATIVE TITER
ENA SCL70 AB SER-ACNC: NEGATIVE
ENA SM+RNP AB SER-ACNC: NEGATIVE
ENA SS-A AB SER-ACNC: 20 ELISA UNIT
ENA SS-B AB SER-ACNC: 20 ELISA UNIT
ERYTHROCYTE [SEDIMENTATION RATE] IN BLOOD BY WESTERGREN METHOD: 3 MM/HR (ref 0–20)
FOLATE SERPL-MCNC: 8.2 NG/ML
GAMMA GLOB SERPL ELPH-MCNC: 0.9 G/DL (ref 0.5–1.2)
Lab: 0 NMOL/L (ref 0–0.02)
P-ANCA TITR SER IF: 9 RU/ML
PROT PATTERN SERPL ELPH-IMP: ABNORMAL
PROT SERPL-MCNC: 6.8 G/DL (ref 6.3–8.3)
SL AMB PROTEIN ELECTROPHORESIS, S: ABNORMAL
SL AMB SMITH ANTIBODIES: NEGATIVE
VIT B1 BLD-SCNC: 144 NMOL/L (ref 70–180)
VIT B12 SERPL-MCNC: 183 PG/ML (ref 180–914)

## 2025-06-02 NOTE — TELEPHONE ENCOUNTER
NICK Ridley called to advise the Lab is unable to run the reflex to MuSK Ab test so will run as follows:      Acetylcholine Receptor (AChR) Ab   AND  MuSK Ab    Also is asking for the acetylchoine receptor Ab portion, do you want the modulating, binding or blocking or all 3?

## 2025-06-02 NOTE — TELEPHONE ENCOUNTER
Called [facility] to advise, spoke to lydia, aware to run all 3; verbalized understanding; no further questions.

## 2025-06-09 ENCOUNTER — HOSPITAL ENCOUNTER (OUTPATIENT)
Dept: RADIOLOGY | Facility: IMAGING CENTER | Age: 40
Discharge: HOME | End: 2025-06-09
Attending: NURSE PRACTITIONER
Payer: COMMERCIAL

## 2025-06-09 DIAGNOSIS — H53.2 DIPLOPIA: ICD-10-CM

## 2025-06-09 PROCEDURE — 70553 MRI BRAIN STEM W/O & W/DYE: CPT

## 2025-06-09 PROCEDURE — A9585 GADOBUTROL INJECTION: HCPCS | Performed by: NURSE PRACTITIONER

## 2025-06-09 PROCEDURE — 70543 MRI ORBT/FAC/NCK W/O &W/DYE: CPT

## 2025-06-09 RX ORDER — GADOBUTROL 604.72 MG/ML
7 INJECTION INTRAVENOUS
Status: COMPLETED | OUTPATIENT
Start: 2025-06-09 | End: 2025-06-09

## 2025-06-09 RX ADMIN — GADOBUTROL 7 ML: 604.72 INJECTION INTRAVENOUS at 14:02

## 2025-06-10 ENCOUNTER — TELEPHONE (OUTPATIENT)
Age: 40
End: 2025-06-10

## 2025-06-10 DIAGNOSIS — R76.8 POSITIVE ANA (ANTINUCLEAR ANTIBODY): Primary | ICD-10-CM

## 2025-06-10 NOTE — TELEPHONE ENCOUNTER
Pt is requesting a rheumatology referral , She stated it was previously discussed during her last visit and she would like to schedule an appt.

## 2025-06-10 NOTE — TELEPHONE ENCOUNTER
Patient called in stated seen neurologist and had blood work done and was recommended to have B12 injections done.Patient would like to have done here in office. Please advise patient once B12 shots have been ordered and can be scheduled. Thank you      Jagjit Ellsworth

## 2025-06-12 ENCOUNTER — RESULTS FOLLOW-UP (OUTPATIENT)
Dept: NEUROLOGY | Facility: CLINIC | Age: 40
End: 2025-06-12

## 2025-06-17 ENCOUNTER — OFFICE VISIT (OUTPATIENT)
Dept: FAMILY MEDICINE CLINIC | Facility: CLINIC | Age: 40
End: 2025-06-17
Payer: COMMERCIAL

## 2025-06-17 VITALS
DIASTOLIC BLOOD PRESSURE: 80 MMHG | HEIGHT: 69 IN | SYSTOLIC BLOOD PRESSURE: 126 MMHG | OXYGEN SATURATION: 99 % | BODY MASS INDEX: 24.07 KG/M2 | WEIGHT: 162.5 LBS | HEART RATE: 65 BPM | TEMPERATURE: 97.7 F

## 2025-06-17 DIAGNOSIS — Z00.00 ANNUAL PHYSICAL EXAM: Primary | ICD-10-CM

## 2025-06-17 DIAGNOSIS — E53.8 VITAMIN B12 DEFICIENCY: ICD-10-CM

## 2025-06-17 DIAGNOSIS — F32.5 MAJOR DEPRESSION IN REMISSION (HCC): ICD-10-CM

## 2025-06-17 PROBLEM — IMO0001 SMOKING: Status: RESOLVED | Noted: 2021-05-06 | Resolved: 2025-06-17

## 2025-06-17 PROCEDURE — 96372 THER/PROPH/DIAG INJ SC/IM: CPT | Performed by: NURSE PRACTITIONER

## 2025-06-17 PROCEDURE — 99395 PREV VISIT EST AGE 18-39: CPT | Performed by: NURSE PRACTITIONER

## 2025-06-17 RX ORDER — CYANOCOBALAMIN 1000 UG/ML
1000 INJECTION, SOLUTION INTRAMUSCULAR; SUBCUTANEOUS
Status: SHIPPED | OUTPATIENT
Start: 2025-06-17 | End: 2025-12-14

## 2025-06-17 RX ADMIN — CYANOCOBALAMIN 1000 MCG: 1000 INJECTION, SOLUTION INTRAMUSCULAR; SUBCUTANEOUS at 09:42

## 2025-06-17 NOTE — PROGRESS NOTES
Adult Annual Physical  Name: Matt Barbour      : 1985      MRN: [id number]  Encounter Provider: NAGI Gonzalez  Encounter Date: 2025   Encounter department: [facility]    :  Assessment & Plan  Annual physical exam         Vitamin B12 deficiency    Orders:  •  cyanocobalamin injection 1,000 mcg    Major depression in remission (HCC)               Preventive Screenings:  - Diabetes Screening: risks/benefits discussed and orders placed  - Cholesterol Screening: risks/benefits discussed and orders placed   - Hepatitis C screening: screening up-to-date   - HIV screening: screening up-to-date   - Cervical cancer screening: screening up-to-date   - Colon cancer screening: screening not indicated   - Lung cancer screening: screening not indicated     Counseling/Anticipatory Guidance:  - Alcohol: discussed moderation in alcohol intake and recommendations for healthy alcohol use.   - Dental health: discussed importance of regular tooth brushing, flossing, and dental visits.   - Sexual health: discussed sexually transmitted diseases, partner selection, use of condoms, avoidance of unintended pregnancy, and contraceptive alternatives.   - Diet: discussed recommendations for a healthy/well-balanced diet.   - Exercise: the importance of regular exercise/physical activity was discussed. Recommend exercise 3-5 times per week for at least 30 minutes.   - Injury prevention: discussed safety/seat belts, safety helmets, smoke detectors, carbon monoxide detectors, and smoking near bedding or upholstery.       Depression Screening and Follow-up Plan: Patient was screened for depression during today's encounter. They screened negative with a PHQ-9 score of 4.      Tobacco Cessation Counseling: Tobacco cessation counseling was provided. The patient is sincerely urged to quit consumption of tobacco. She is ready to quit tobacco.         History of Present Illness     Adult Annual  Physical:  Patient presents for annual physical. Patient here for a comprehensive physical exam. The patient reports that she saw a neurologist because of having numbness and generalized myalgia. There was some white matter changes in the MRI that the nurse practitioner in neuro.,ogy was going to discuss with a neurologist. It was recommend that she receive Vitamin B-12 injections so plan to perform every month for 6 month.  .     Diet and Physical Activity:  - Diet/Nutrition: well balanced diet, heart healthy (low sodium) diet, consuming 3-5 servings of fruits/vegetables daily, adequate fiber intake and adequate whole grain intake.  - Exercise: moderate cardiovascular exercise. Yoga    Depression Screening:    - PHQ-9 Score: 4    General Health:  - Sleep: 7-8 hours of sleep on average.  - Hearing: normal hearing bilateral ears.  - Vision: vision problems, most recent eye exam > 1 year ago and wears glasses.  - Dental: regular dental visits, brushes teeth twice daily and floss regularly.    /GYN Health:  - Follows with GYN: yes.   - Menopause: premenopausal.   - Last menstrual cycle: 6/8/2025.   - History of STDs: no  - Contraception: tubal ligation.      Advanced Care Planning:  - Has an advanced directive?: no    - Has a durable medical POA?: no    - ACP document given to patient?: no      Review of Systems   Constitutional:  Negative for activity change, appetite change and unexpected weight change.   HENT:  Negative for dental problem, ear pain, hearing loss, nosebleeds, sneezing, sore throat, tinnitus and trouble swallowing.    Eyes:  Negative for visual disturbance.   Respiratory:  Negative for cough, chest tightness, shortness of breath and wheezing.    Cardiovascular:  Negative for chest pain, palpitations and leg swelling.   Gastrointestinal:  Negative for abdominal distention, abdominal pain, constipation, diarrhea and nausea.   Endocrine: Negative for polydipsia and polyuria.   Genitourinary: Negative.   "  Musculoskeletal:  Positive for myalgias. Negative for arthralgias, back pain and neck pain.   Skin:  Negative for color change and rash.   Allergic/Immunologic: Negative for environmental allergies.   Neurological:  Positive for headaches. Negative for dizziness, weakness and light-headedness.   Hematological: Negative.    Psychiatric/Behavioral: Negative.  Negative for dysphoric mood and sleep disturbance. The patient is not nervous/anxious.          Objective   /80 (BP Location: Left arm, Patient Position: Sitting, Cuff Size: Large)   Pulse 65   Temp 97.7 °F (36.5 °C) (Temporal)   Ht 5' 9\" (1.753 m)   Wt 73.7 kg (162 lb 8 oz)   LMP 06/08/2025   SpO2 99%   BMI 24.00 kg/m² (Reviewed)    Physical Exam  Vitals reviewed.   Constitutional:       General: She is not in acute distress.     Appearance: Normal appearance. She is well-developed and well-groomed. She is not ill-appearing.   HENT:      Head: Normocephalic and atraumatic.      Right Ear: External ear normal.      Left Ear: External ear normal.      Nose: Nose normal.      Mouth/Throat:      Lips: Pink.      Mouth: Mucous membranes are moist.      Pharynx: Oropharynx is clear.     Eyes:      General: Lids are normal.      Extraocular Movements: Extraocular movements intact.      Conjunctiva/sclera: Conjunctivae normal.      Pupils: Pupils are equal, round, and reactive to light.     Neck:      Thyroid: No thyromegaly or thyroid tenderness.      Trachea: Trachea and phonation normal.     Cardiovascular:      Rate and Rhythm: Normal rate and regular rhythm.      Pulses:           Radial pulses are 2+ on the right side and 2+ on the left side.        Dorsalis pedis pulses are 2+ on the right side and 2+ on the left side.        Posterior tibial pulses are 2+ on the right side and 2+ on the left side.      Heart sounds: Normal heart sounds. No murmur heard.  Pulmonary:      Effort: Pulmonary effort is normal.      Breath sounds: Normal breath sounds. "   Abdominal:      General: Abdomen is flat. Bowel sounds are normal. There is no distension.      Palpations: Abdomen is soft.      Tenderness: There is no abdominal tenderness.     Musculoskeletal:         General: Normal range of motion.      Cervical back: Neck supple.      Right lower leg: No edema.      Left lower leg: No edema.     Skin:     General: Skin is warm and dry.      Capillary Refill: Capillary refill takes less than 2 seconds.     Neurological:      General: No focal deficit present.      Mental Status: She is alert and oriented to person, place, and time.      Cranial Nerves: Cranial nerves 2-12 are intact.     Psychiatric:         Mood and Affect: Mood normal.         Behavior: Behavior normal. Behavior is cooperative.

## 2025-06-17 NOTE — PATIENT INSTRUCTIONS
"Patient Education     Routine physical for adults   The Basics   Written by the doctors and editors at [facility]   What is a physical? -- A physical is a routine visit, or \"check-up,\" with your doctor. You might also hear it called a \"wellness visit\" or \"preventive visit.\"  During each visit, the doctor will:   Ask about your physical and mental health   Ask about your habits, behaviors, and lifestyle   Do an exam   Give you vaccines if needed   Talk to you about any medicines you take   Give advice about your health   Answer your questions  Getting regular check-ups is an important part of taking care of your health. It can help your doctor find and treat any problems you have. But it's also important for preventing health problems.  A routine physical is different from a \"sick visit.\" A sick visit is when you see a doctor because of a health concern or problem. Since physicals are scheduled ahead of time, you can think about what you want to ask the doctor.  How often should I get a physical? -- It depends on your age and health. In general, for people age 21 years and older:   If you are younger than 50 years, you might be able to get a physical every 3 years.   If you are 50 years or older, your doctor might recommend a physical every year.  If you have an ongoing health condition, like diabetes or high blood pressure, your doctor will probably want to see you more often.  What happens during a physical? -- In general, each visit will include:   Physical exam - The doctor or nurse will check your height, weight, heart rate, and blood pressure. They will also look at your eyes and ears. They will ask about how you are feeling and whether you have any symptoms that bother you.   Medicines - It's a good idea to bring a list of all the medicines you take to each doctor visit. Your doctor will talk to you about your medicines and answer any questions. Tell them if you are having any side effects that bother you. You " "should also tell them if you are having trouble paying for any of your medicines.   Habits and behaviors - This includes:   Your diet   Your exercise habits   Whether you smoke, drink alcohol, or use drugs   Whether you are sexually active   Whether you feel safe at home  Your doctor will talk to you about things you can do to improve your health and lower your risk of health problems. They will also offer help and support. For example, if you want to quit smoking, they can give you advice and might prescribe medicines. If you want to improve your diet or get more physical activity, they can help you with this, too.   Lab tests, if needed - The tests you get will depend on your age and situation. For example, your doctor might want to check your:   Cholesterol   Blood sugar   Iron level   Vaccines - The recommended vaccines will depend on your age, health, and what vaccines you already had. Vaccines are very important because they can prevent certain serious or deadly infections.   Discussion of screening - \"Screening\" means checking for diseases or other health problems before they cause symptoms. Your doctor can recommend screening based on your age, risk, and preferences. This might include tests to check for:   Cancer, such as breast, prostate, cervical, ovarian, colorectal, prostate, lung, or skin cancer   Sexually transmitted infections, such as chlamydia and gonorrhea   Mental health conditions like depression and anxiety  Your doctor will talk to you about the different types of screening tests. They can help you decide which screenings to have. They can also explain what the results might mean.   Answering questions - The physical is a good time to ask the doctor or nurse questions about your health. If needed, they can refer you to other doctors or specialists, too.  Adults older than 65 years often need other care, too. As you get older, your doctor will talk to you about:   How to prevent falling at " home   Hearing or vision tests   Memory testing   How to take your medicines safely   Making sure that you have the help and support you need at home  All topics are updated as new evidence becomes available and our peer review process is complete.  This topic retrieved from [employer] on: May 02, 2024.  Topic 702791 Version 1.0  Release: 32.4.3 - C32.122  © 2024 UpToDate, Inc. and/or its affiliates. All rights reserved.  Consumer Information Use and Disclaimer   Disclaimer: This generalized information is a limited summary of diagnosis, treatment, and/or medication information. It is not meant to be comprehensive and should be used as a tool to help the user understand and/or assess potential diagnostic and treatment options. It does NOT include all information about conditions, treatments, medications, side effects, or risks that may apply to a specific patient. It is not intended to be medical advice or a substitute for the medical advice, diagnosis, or treatment of a health care provider based on the health care provider's examination and assessment of a patient's specific and unique circumstances. Patients must speak with a health care provider for complete information about their health, medical questions, and treatment options, including any risks or benefits regarding use of medications. This information does not endorse any treatments or medications as safe, effective, or approved for treating a specific patient. UpToDate, Inc. and its affiliates disclaim any warranty or liability relating to this information or the use thereof.The use of this information is governed by the Terms of Use, available at https://www.woltersPEERuwer.com/en/know/clinical-effectiveness-terms. 2024© UpToDate, Inc. and its affiliates and/or licensors. All rights reserved.  Copyright   © 2024 UpToDate, Inc. and/or its affiliates. All rights reserved.

## 2025-06-18 NOTE — PROGRESS NOTES
Rheumatology Initial Outpatient Visit  Name: Matt Barbour      : 1985      MRN: [id number]  Encounter Provider: Isela Salgado MD  Encounter Date: 2025   Encounter department: [facility] [address]  :  Assessment & Plan  Positive BERNIE (antinuclear antibody)  39-year-old female who presents for further evaluation of BERNIE 1: 160.  Patient reports constellation of symptoms including joint pain and swelling predominantly affecting the hands and feet, neuropathic pain in the left greater than right upper and lower extremities, dry eye/dry mouth, and fatigue.  She has been seen by neurology and currently undergoing workup which resulted in positive BERNIE 1:160.  Given her reports of dry eye and dry mouth and neurologic symptoms, could consider Sjogren syndrome.  Serologic testing for SSA and SSB was negative however I would recommend salivary gland biopsy.  Patient was agreeable so referral to ENT was placed.  Patient will be undergoing EMG for further workup of her neurologic complaints, but pending workup for Sjogren's could also consider small fiber neuropathy as this commonly coexist.  No clear features of SLE at this time, but will obtain complete antibody profile as well as complements.  Additionally, given her complaints of hand and foot pain and swelling, will obtain RF and CCP as well as ultrasound of the bilateral hands and feet.  Patient will follow-up in 4 weeks to review results.  Orders:    Ambulatory Referral to Rheumatology    US RA Hands/Wrists; Future    US RA FEET/ANKLES; Future    BERNIE Screen w/Reflex Cascade; Future    C3 complement; Future    C4 complement; Future    Cyclic citrul peptide antibody, IgG; Future    RHEUMATOID FACTOR; Future    CBC and differential; Future    Comprehensive metabolic panel; Future    Protein / creatinine ratio, urine; Future    Urinalysis with microscopic; Future    Sicca complex (HCC)    Orders:    Ambulatory Referral to Otolaryngology;  Future    Arthralgia, unspecified joint         Neuropathy           History of Present Illness   HPI     History of Present Illness  The patient presents for evaluation of evaluation of positive BERNIE.     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 These symptoms include heaviness in her legs and swelling in her ankles and feet. Despite consultations with a vascular specialist and a rheumatologist, no definitive diagnosis was made. Over the past 5 months, she has noticed increased difficulty in word recall, frequent dropping of objects, and severe fatigue. She also reports aching, heaviness, tripping, and jaw pain. A neurologist recommended further evaluation by a rheumatologist.    Her pain is predominantly on the left side, with sudden onset of unusual pain in her jaw, legs, feet, and hands. She describes a pins-and-needles sensation in her legs, feet, and hands for the past 5 years, which she initially attributed to her job. Compression stockings did not provide relief. The pain is more pronounced on her left side, with a constant radiating pain in the left side of her head. She reports constant tiredness in her legs and difficulty in performing her job due to the need for frequent breaks. She does not experience acute pain but describes a constant ache and heaviness. She reports joint pain in her hands and feet, with climbing up a little bit more in her feet and ankles. She describes the sensation as fuzzy rather than painful. The swelling in her feet and ankles is weather-dependent. She also reports dry mouth and eyes, particularly at night. She attempts to alleviate her symptoms by sitting while working but finds no consistent pattern. Her symptoms improve with rest but worsen with prolonged inactivity. She feels unwell upon waking and has disrupted sleep due to discomfort. She has limited energy for about an hour after  "waking. She experiences stiffness in her hands, neck, and back, but not in her legs or knees.    She has tried ibuprofen without relief and has discontinued it due to concerns about liver and stomach health. She has also tried turmeric and avoids processed foods and sugars. She has always been achy but recently started experiencing fogginess, dry mouth and eyes, tripping, dropping things, and fatigue. She uses eyedrops daily and takes vitamins. She reports no cheek swelling but has bad TMJ and recently lost her appetite due to jaw fatigue. She has noticed sores in her mouth and nose, which she believes are canker sores. She has had skin rashes on the bottoms of her feet for a long time, which harden into blisters and then leave dry patches. She reports no itchy rashes. She experiences shortness of breath, which she attributes to being out of shape, and abdominal pain, which she associates with gas or bloating. She reports no blood in her urine but occasionally notices darker urine, which she attributes to dehydration.    SOCIAL HISTORY  She does not drink alcohol or smoke.    Review of Systems  Complete ROS conducted as per HPI.   + Increased temperature, but denies fever  + Intermittent rash on bottom of feet  + Dry eye, dry mouth  + Aphthous ulcers  + Shortness of breath  + Raynaud's    In addition, denies:  Fever  Photosensitive rash  Uveitis  Dactylitis  Chest pain  Pleurisy  Gross hematuria  Foamy urine  Joint issues other than noted above      Objective   /80 (BP Location: Left arm, Patient Position: Sitting, Cuff Size: Standard)   Pulse 71   Temp 98.6 °F (37 °C) (Tympanic)   Ht 5' 9\" (1.753 m)   Wt 72.6 kg (160 lb)   LMP 06/08/2025   SpO2 99%   BMI 23.63 kg/m²      Physical Exam  Physical Exam  Constitutional: well appearing, no acute distress  HEENT: normocephalic, sclera clear, +ulcer on lower inner lip  Neck: supple, no palpable cervical adenopathy  CV: regular rate and rhythm, no " murmur  Pulm: normal respiratory effort, lungs clear to auscultation b/l  Skin: no rashes, no skin thickening  Extremities: warm and well perfused, no edema  MSK:  - Observation: Normal  - Palpation: Generally tender to palpation throughout most of the joints  - Synovitis: Absent  - Effusion: Absent  - ROM: Normal throughout      Labs and Imaging  I have personally reviewed pertinent labs and imaging.

## 2025-06-19 ENCOUNTER — CONSULT (OUTPATIENT)
Age: 40
End: 2025-06-19
Payer: COMMERCIAL

## 2025-06-19 ENCOUNTER — APPOINTMENT (OUTPATIENT)
Dept: LAB | Facility: CLINIC | Age: 40
End: 2025-06-19
Payer: COMMERCIAL

## 2025-06-19 VITALS
BODY MASS INDEX: 23.7 KG/M2 | HEART RATE: 71 BPM | TEMPERATURE: 98.6 F | OXYGEN SATURATION: 99 % | WEIGHT: 160 LBS | SYSTOLIC BLOOD PRESSURE: 136 MMHG | HEIGHT: 69 IN | DIASTOLIC BLOOD PRESSURE: 80 MMHG

## 2025-06-19 DIAGNOSIS — M35.00 SICCA COMPLEX (HCC): ICD-10-CM

## 2025-06-19 DIAGNOSIS — R76.8 POSITIVE ANA (ANTINUCLEAR ANTIBODY): ICD-10-CM

## 2025-06-19 DIAGNOSIS — M25.50 ARTHRALGIA, UNSPECIFIED JOINT: ICD-10-CM

## 2025-06-19 DIAGNOSIS — G62.9 NEUROPATHY: ICD-10-CM

## 2025-06-19 DIAGNOSIS — R76.8 POSITIVE ANA (ANTINUCLEAR ANTIBODY): Primary | ICD-10-CM

## 2025-06-19 LAB
ALBUMIN SERPL BCG-MCNC: 4.6 G/DL (ref 3.5–5)
ALP SERPL-CCNC: 56 U/L (ref 34–104)
ALT SERPL W P-5'-P-CCNC: 14 U/L (ref 7–52)
AMORPH URATE CRY URNS QL MICRO: NORMAL
ANION GAP SERPL CALCULATED.3IONS-SCNC: 9 MMOL/L (ref 4–13)
AST SERPL W P-5'-P-CCNC: 15 U/L (ref 13–39)
BACTERIA UR QL AUTO: NORMAL /HPF
BASOPHILS # BLD AUTO: 0.08 THOUSANDS/ÂΜL (ref 0–0.1)
BASOPHILS NFR BLD AUTO: 2 % (ref 0–1)
BILIRUB SERPL-MCNC: 0.57 MG/DL (ref 0.2–1)
BILIRUB UR QL STRIP: NEGATIVE
BUN SERPL-MCNC: 8 MG/DL (ref 5–25)
C3 SERPL-MCNC: 106 MG/DL (ref 87–200)
C4 SERPL-MCNC: 37 MG/DL (ref 19–52)
CALCIUM SERPL-MCNC: 9.5 MG/DL (ref 8.4–10.2)
CHLORIDE SERPL-SCNC: 100 MMOL/L (ref 96–108)
CLARITY UR: NORMAL
CO2 SERPL-SCNC: 29 MMOL/L (ref 21–32)
COLOR UR: YELLOW
CREAT SERPL-MCNC: 0.82 MG/DL (ref 0.6–1.3)
CREAT UR-MCNC: 119.8 MG/DL
EOSINOPHIL # BLD AUTO: 0.05 THOUSAND/ÂΜL (ref 0–0.61)
EOSINOPHIL NFR BLD AUTO: 1 % (ref 0–6)
ERYTHROCYTE [DISTWIDTH] IN BLOOD BY AUTOMATED COUNT: 12.4 % (ref 11.6–15.1)
GFR SERPL CREATININE-BSD FRML MDRD: 91 ML/MIN/1.73SQ M
GLUCOSE SERPL-MCNC: 95 MG/DL (ref 65–140)
GLUCOSE UR STRIP-MCNC: NEGATIVE MG/DL
HCT VFR BLD AUTO: 39.3 % (ref 34.8–46.1)
HGB BLD-MCNC: 12.9 G/DL (ref 11.5–15.4)
HGB UR QL STRIP.AUTO: NEGATIVE
IMM GRANULOCYTES # BLD AUTO: 0.02 THOUSAND/UL (ref 0–0.2)
IMM GRANULOCYTES NFR BLD AUTO: 0 % (ref 0–2)
KETONES UR STRIP-MCNC: NEGATIVE MG/DL
LEUKOCYTE ESTERASE UR QL STRIP: NEGATIVE
LYMPHOCYTES # BLD AUTO: 1.49 THOUSANDS/ÂΜL (ref 0.6–4.47)
LYMPHOCYTES NFR BLD AUTO: 30 % (ref 14–44)
MCH RBC QN AUTO: 30.6 PG (ref 26.8–34.3)
MCHC RBC AUTO-ENTMCNC: 32.8 G/DL (ref 31.4–37.4)
MCV RBC AUTO: 93 FL (ref 82–98)
MONOCYTES # BLD AUTO: 0.33 THOUSAND/ÂΜL (ref 0.17–1.22)
MONOCYTES NFR BLD AUTO: 7 % (ref 4–12)
NEUTROPHILS # BLD AUTO: 2.93 THOUSANDS/ÂΜL (ref 1.85–7.62)
NEUTS SEG NFR BLD AUTO: 60 % (ref 43–75)
NITRITE UR QL STRIP: NEGATIVE
NON-SQ EPI CELLS URNS QL MICRO: NORMAL /HPF
NRBC BLD AUTO-RTO: 0 /100 WBCS
PH UR STRIP.AUTO: 7 [PH]
PLATELET # BLD AUTO: 243 THOUSANDS/UL (ref 149–390)
PMV BLD AUTO: 10.9 FL (ref 8.9–12.7)
POTASSIUM SERPL-SCNC: 3.8 MMOL/L (ref 3.5–5.3)
PROT SERPL-MCNC: 6.9 G/DL (ref 6.4–8.4)
PROT UR STRIP-MCNC: NEGATIVE MG/DL
PROT UR-MCNC: 9.5 MG/DL
PROT/CREAT UR: 0.1 MG/G{CREAT}
RBC # BLD AUTO: 4.22 MILLION/UL (ref 3.81–5.12)
RBC #/AREA URNS AUTO: NORMAL /HPF
RHEUMATOID FACT SERPL-ACNC: <10 IU/ML
SODIUM SERPL-SCNC: 138 MMOL/L (ref 135–147)
SP GR UR STRIP.AUTO: 1.02 (ref 1–1.03)
UROBILINOGEN UR STRIP-ACNC: <2 MG/DL
WBC # BLD AUTO: 4.9 THOUSAND/UL (ref 4.31–10.16)
WBC #/AREA URNS AUTO: NORMAL /HPF

## 2025-06-19 PROCEDURE — 99204 OFFICE O/P NEW MOD 45 MIN: CPT | Performed by: STUDENT IN AN ORGANIZED HEALTH CARE EDUCATION/TRAINING PROGRAM

## 2025-06-19 PROCEDURE — 86038 ANTINUCLEAR ANTIBODIES: CPT

## 2025-06-19 PROCEDURE — 86200 CCP ANTIBODY: CPT

## 2025-06-19 PROCEDURE — 36415 COLL VENOUS BLD VENIPUNCTURE: CPT

## 2025-06-19 PROCEDURE — 82570 ASSAY OF URINE CREATININE: CPT

## 2025-06-19 PROCEDURE — 84156 ASSAY OF PROTEIN URINE: CPT

## 2025-06-19 PROCEDURE — 86431 RHEUMATOID FACTOR QUANT: CPT

## 2025-06-19 PROCEDURE — 85025 COMPLETE CBC W/AUTO DIFF WBC: CPT

## 2025-06-19 PROCEDURE — 86225 DNA ANTIBODY NATIVE: CPT

## 2025-06-19 PROCEDURE — 80053 COMPREHEN METABOLIC PANEL: CPT

## 2025-06-19 PROCEDURE — 86160 COMPLEMENT ANTIGEN: CPT

## 2025-06-19 PROCEDURE — 81001 URINALYSIS AUTO W/SCOPE: CPT

## 2025-06-24 LAB
DSDNA IGG SERPL IA-ACNC: <0.9 IU/ML (ref ?–15)
NUCLEAR IGG SER IA-RTO: 0.3 RATIO (ref ?–1)

## 2025-06-27 LAB — CCP AB SER IA-ACNC: 1.5 (ref ?–10)

## 2025-06-30 DIAGNOSIS — F90.1 ATTENTION DEFICIT HYPERACTIVITY DISORDER (ADHD), PREDOMINANTLY HYPERACTIVE TYPE: ICD-10-CM

## 2025-06-30 RX ORDER — DEXTROAMPHETAMINE SACCHARATE, AMPHETAMINE ASPARTATE MONOHYDRATE, DEXTROAMPHETAMINE SULFATE AND AMPHETAMINE SULFATE 7.5; 7.5; 7.5; 7.5 MG/1; MG/1; MG/1; MG/1
30 CAPSULE, EXTENDED RELEASE ORAL EVERY MORNING
Qty: 30 CAPSULE | Refills: 0 | Status: SHIPPED | OUTPATIENT
Start: 2025-06-30

## 2025-06-30 NOTE — TELEPHONE ENCOUNTER
1 [id number] ** 05/22/2025 05/22/2025 LORazepam (Tablet) 30.0 7 0.5 MG NA ELSPETH BLACK-BRITTON [facility] Commercial Insurance 0 / 0 [place]    1 [id number] ** 05/22/2025 05/22/2025 Mixed Amphetamine Salts (Capsule, Extended Release) 30.0 30 30 MG NA ELSPETH BLACK-BRITTON [facility] Commercial Insurance 0 / 0 [place]    1 [id number] ** 04/14/2025 04/14/2025 Mixed Amphetamine Salts (Capsule, Extended Release) 30.0 30 30 MG NA ELSPETH BLACK-BRITTON [facility] Commercial Insurance 0 / 0 [place]    1 [id number] ** 03/11/2025 03/11/2025 LORazepam (Tablet) 30.0 7 0.5 MG NA JOSE ALFREDO [facility]IE [facility] Commercial Insurance 0 / 0 [place]    1 [id number] ** 03/11/2025 03/11/2025 Mixed Amphetamine Salts (Capsule, Extended Release) 30.0 30 25 MG NA JOSE ALFREDO BRANDIE [facility] Commercial Insurance 0 / 0 [place]    1 [id number] ** 02/06/2025 02/06/2025 LORazepam (Tablet) 30.0 7 0.5 MG NA JOSE ALFREDO BRANDIE [facility] Commercial Insurance 0 / 0 [place]    1 [id number] ** 02/06/2025 02/06/2025 Mixed Amphetamine Salts (Capsule, Extended Release) 30.0 30 25 MG NA JOSE ALFREDO BRANDIE [facility] Commercial Insurance 0 / 0 [place]    1 [id number] ** 01/06/2025 01/03/2025 Mixed Amphetamine Salts (Capsule, Extended Release) 30.0 30 25 MG NA BOB BROADT [facility] Commercial Insurance 0 / 0 [place]    1 [id number] ** 01/03/2025 01/03/2025 LORazepam (Tablet) 30.0 7 0.5 MG NA BOB BROADT [facility] Commercial Insurance 0 / 0 [place]    1 [id number] ** 12/02/2024 12/02/2024 Pregabalin (Capsule) 60.0 30 75 MG NA JOSE ALFREDO BRANDIE [facility] Commercial Insurance 0 / 1

## 2025-07-31 DIAGNOSIS — F90.1 ATTENTION DEFICIT HYPERACTIVITY DISORDER (ADHD), PREDOMINANTLY HYPERACTIVE TYPE: ICD-10-CM

## 2025-07-31 RX ORDER — DEXTROAMPHETAMINE SACCHARATE, AMPHETAMINE ASPARTATE MONOHYDRATE, DEXTROAMPHETAMINE SULFATE AND AMPHETAMINE SULFATE 7.5; 7.5; 7.5; 7.5 MG/1; MG/1; MG/1; MG/1
30 CAPSULE, EXTENDED RELEASE ORAL EVERY MORNING
Qty: 30 CAPSULE | Refills: 0 | Status: SHIPPED | OUTPATIENT
Start: 2025-07-31